# Patient Record
Sex: FEMALE | NOT HISPANIC OR LATINO | URBAN - METROPOLITAN AREA
[De-identification: names, ages, dates, MRNs, and addresses within clinical notes are randomized per-mention and may not be internally consistent; named-entity substitution may affect disease eponyms.]

---

## 2018-12-11 ENCOUNTER — OFFICE VISIT (OUTPATIENT)
Dept: URBAN - METROPOLITAN AREA CLINIC 76 | Facility: CLINIC | Age: 73
End: 2018-12-11
Payer: COMMERCIAL

## 2018-12-11 DIAGNOSIS — Z96.1 PRESENCE OF INTRAOCULAR LENS: ICD-10-CM

## 2018-12-11 DIAGNOSIS — H43.813 VITREOUS DEGENERATION, BILATERAL: ICD-10-CM

## 2018-12-11 DIAGNOSIS — H52.4 PRESBYOPIA: Primary | ICD-10-CM

## 2018-12-11 DIAGNOSIS — H10.45 OTHER CHRONIC ALLERGIC CONJUNCTIVITIS: ICD-10-CM

## 2018-12-11 PROCEDURE — 92004 COMPRE OPH EXAM NEW PT 1/>: CPT | Performed by: OPTOMETRIST

## 2018-12-11 RX ORDER — KETOTIFEN FUMARATE 0.35 MG/ML
SOLUTION/ DROPS OPHTHALMIC
Qty: 1 | Refills: 5 | Status: ACTIVE
Start: 2018-12-11

## 2018-12-11 ASSESSMENT — INTRAOCULAR PRESSURE
OS: 14
OD: 15

## 2018-12-11 ASSESSMENT — KERATOMETRY
OD: 45.63
OS: 44.00

## 2018-12-11 ASSESSMENT — VISUAL ACUITY
OD: 20/30
OS: 20/25

## 2018-12-11 NOTE — IMPRESSION/PLAN
Impression: Vitreous degeneration, bilateral: H43.813 OU. Plan: Posterior vitreous detachment accounts for the patient's complaints. There is no evidence of retinal pathology. All signs and risks of retinal detachment and tears were discussed in detail. Patient instructed to call the office immediately if any symptoms noted. Recommend the patient return to office for 1 month follow up.

## 2018-12-11 NOTE — IMPRESSION/PLAN
Impression: Presbyopia: H52.4. OU. Plan: Discussed condition. New mrx given today. Pt to call with any concerns.

## 2018-12-11 NOTE — IMPRESSION/PLAN
Impression: Other chronic allergic conjunctivitis: H10.45. OU. Plan: Discussed diagnosis with patient. Recommend OTC oral antihistamine, and Ketotifen 1 drop bid ou, prn. Rub excess into lids.

## 2024-07-19 ENCOUNTER — HOSPITAL ENCOUNTER (INPATIENT)
Facility: HOSPITAL | Age: 79
LOS: 5 days | Discharge: HOME HEALTH | DRG: 673 | End: 2024-07-24
Attending: EMERGENCY MEDICINE | Admitting: EMERGENCY MEDICINE
Payer: MEDICARE

## 2024-07-19 DIAGNOSIS — N17.9 ACUTE RENAL FAILURE: ICD-10-CM

## 2024-07-19 DIAGNOSIS — R53.83 FATIGUE, UNSPECIFIED TYPE: ICD-10-CM

## 2024-07-19 DIAGNOSIS — M79.89 SWELLING OF ARM: ICD-10-CM

## 2024-07-19 DIAGNOSIS — N17.9 ACUTE RENAL FAILURE, UNSPECIFIED ACUTE RENAL FAILURE TYPE: Primary | ICD-10-CM

## 2024-07-19 DIAGNOSIS — I10 HTN (HYPERTENSION), BENIGN: ICD-10-CM

## 2024-07-19 DIAGNOSIS — R10.13 EPIGASTRIC PAIN: ICD-10-CM

## 2024-07-19 DIAGNOSIS — R11.0 NAUSEA: ICD-10-CM

## 2024-07-19 LAB
ABS NEUT (OLG): 10.51 X10(3)/MCL (ref 2.1–9.2)
ALBUMIN SERPL-MCNC: 4.2 G/DL (ref 3.4–4.8)
ALBUMIN/GLOB SERPL: 1.4 RATIO (ref 1.1–2)
ALP SERPL-CCNC: 73 UNIT/L (ref 40–150)
ALT SERPL-CCNC: 9 UNIT/L (ref 0–55)
ANION GAP SERPL CALC-SCNC: 16 MEQ/L
APTT PPP: 29 SECONDS (ref 23.2–33.7)
AST SERPL-CCNC: 13 UNIT/L (ref 5–34)
BACTERIA #/AREA URNS AUTO: ABNORMAL /HPF
BASOPHILS NFR BLD MANUAL: 0.17 X10(3)/MCL (ref 0–0.2)
BASOPHILS NFR BLD MANUAL: 1 %
BILIRUB SERPL-MCNC: 0.3 MG/DL
BILIRUB UR QL STRIP.AUTO: NEGATIVE
BUN SERPL-MCNC: 171.8 MG/DL (ref 9.8–20.1)
CALCIUM SERPL-MCNC: 10.6 MG/DL (ref 8.4–10.2)
CHLORIDE SERPL-SCNC: 104 MMOL/L (ref 98–107)
CLARITY UR: CLEAR
CO2 SERPL-SCNC: 15 MMOL/L (ref 23–31)
COLOR UR AUTO: ABNORMAL
CREAT SERPL-MCNC: 8.22 MG/DL (ref 0.55–1.02)
CREAT/UREA NIT SERPL: 21
EOSINOPHIL NFR BLD MANUAL: 0.69 X10(3)/MCL (ref 0–0.9)
EOSINOPHIL NFR BLD MANUAL: 4 %
ERYTHROCYTE [DISTWIDTH] IN BLOOD BY AUTOMATED COUNT: 14 % (ref 11.5–17)
GFR SERPLBLD CREATININE-BSD FMLA CKD-EPI: 5 ML/MIN/1.73/M2
GLOBULIN SER-MCNC: 3.1 GM/DL (ref 2.4–3.5)
GLUCOSE SERPL-MCNC: 125 MG/DL (ref 82–115)
GLUCOSE UR QL STRIP: NORMAL
HBV SURFACE AG SERPL QL IA: NONREACTIVE
HCT VFR BLD AUTO: 30.5 % (ref 37–47)
HGB BLD-MCNC: 10 G/DL (ref 12–16)
HGB UR QL STRIP: NEGATIVE
HYALINE CASTS #/AREA URNS LPF: ABNORMAL /LPF
INR PPP: 1.1
INSTRUMENT WBC (OLG): 17.23 X10(3)/MCL
KETONES UR QL STRIP: NEGATIVE
LEUKOCYTE ESTERASE UR QL STRIP: 250
LIPASE SERPL-CCNC: 61 U/L
LYMPHOCYTES NFR BLD MANUAL: 32 %
LYMPHOCYTES NFR BLD MANUAL: 5.51 X10(3)/MCL
MACROCYTES BLD QL SMEAR: ABNORMAL
MAGNESIUM SERPL-MCNC: 3.5 MG/DL (ref 1.6–2.6)
MCH RBC QN AUTO: 31.7 PG (ref 27–31)
MCHC RBC AUTO-ENTMCNC: 32.8 G/DL (ref 33–36)
MCV RBC AUTO: 96.8 FL (ref 80–94)
MONOCYTES NFR BLD MANUAL: 0.34 X10(3)/MCL (ref 0.1–1.3)
MONOCYTES NFR BLD MANUAL: 2 %
MUCOUS THREADS URNS QL MICRO: ABNORMAL /LPF
NEUTROPHILS NFR BLD MANUAL: 61 %
NITRITE UR QL STRIP: NEGATIVE
NRBC BLD AUTO-RTO: 0 %
OHS QRS DURATION: 92 MS
OHS QTC CALCULATION: 456 MS
OVALOCYTES (OLG): ABNORMAL
PH UR STRIP: 5 [PH]
PHOSPHATE SERPL-MCNC: 7.9 MG/DL (ref 2.3–4.7)
PLATELET # BLD AUTO: 259 X10(3)/MCL (ref 130–400)
PLATELET # BLD EST: NORMAL 10*3/UL
PMV BLD AUTO: 11.5 FL (ref 7.4–10.4)
POIKILOCYTOSIS BLD QL SMEAR: ABNORMAL
POTASSIUM SERPL-SCNC: 4.9 MMOL/L (ref 3.5–5.1)
PROT SERPL-MCNC: 7.3 GM/DL (ref 5.8–7.6)
PROT UR QL STRIP: NEGATIVE
PROTHROMBIN TIME: 13.8 SECONDS (ref 12.5–14.5)
RBC # BLD AUTO: 3.15 X10(6)/MCL (ref 4.2–5.4)
RBC #/AREA URNS AUTO: ABNORMAL /HPF
RBC MORPH BLD: ABNORMAL
SODIUM SERPL-SCNC: 135 MMOL/L (ref 136–145)
SP GR UR STRIP.AUTO: 1.01 (ref 1–1.03)
SQUAMOUS #/AREA URNS LPF: ABNORMAL /HPF
TEAR DROP CELL (OLG): ABNORMAL
UROBILINOGEN UR STRIP-ACNC: NORMAL
WBC # BLD AUTO: 17.23 X10(3)/MCL (ref 4.5–11.5)
WBC #/AREA URNS AUTO: ABNORMAL /HPF

## 2024-07-19 PROCEDURE — 85025 COMPLETE CBC W/AUTO DIFF WBC: CPT | Performed by: PHYSICIAN ASSISTANT

## 2024-07-19 PROCEDURE — 11000001 HC ACUTE MED/SURG PRIVATE ROOM

## 2024-07-19 PROCEDURE — 99291 CRITICAL CARE FIRST HOUR: CPT

## 2024-07-19 PROCEDURE — 63600175 PHARM REV CODE 636 W HCPCS: Performed by: PHYSICIAN ASSISTANT

## 2024-07-19 PROCEDURE — 85027 COMPLETE CBC AUTOMATED: CPT | Performed by: PHYSICIAN ASSISTANT

## 2024-07-19 PROCEDURE — 99900035 HC TECH TIME PER 15 MIN (STAT)

## 2024-07-19 PROCEDURE — 25000003 PHARM REV CODE 250: Performed by: PHYSICIAN ASSISTANT

## 2024-07-19 PROCEDURE — 86706 HEP B SURFACE ANTIBODY: CPT | Performed by: INTERNAL MEDICINE

## 2024-07-19 PROCEDURE — 21400001 HC TELEMETRY ROOM

## 2024-07-19 PROCEDURE — 93010 ELECTROCARDIOGRAM REPORT: CPT | Mod: ,,, | Performed by: INTERNAL MEDICINE

## 2024-07-19 PROCEDURE — 85007 BL SMEAR W/DIFF WBC COUNT: CPT | Performed by: PHYSICIAN ASSISTANT

## 2024-07-19 PROCEDURE — 93005 ELECTROCARDIOGRAM TRACING: CPT

## 2024-07-19 PROCEDURE — 83735 ASSAY OF MAGNESIUM: CPT | Performed by: EMERGENCY MEDICINE

## 2024-07-19 PROCEDURE — 96374 THER/PROPH/DIAG INJ IV PUSH: CPT

## 2024-07-19 PROCEDURE — 5A09357 ASSISTANCE WITH RESPIRATORY VENTILATION, LESS THAN 24 CONSECUTIVE HOURS, CONTINUOUS POSITIVE AIRWAY PRESSURE: ICD-10-PCS | Performed by: INTERNAL MEDICINE

## 2024-07-19 PROCEDURE — 85610 PROTHROMBIN TIME: CPT | Performed by: EMERGENCY MEDICINE

## 2024-07-19 PROCEDURE — 94760 N-INVAS EAR/PLS OXIMETRY 1: CPT

## 2024-07-19 PROCEDURE — 84100 ASSAY OF PHOSPHORUS: CPT | Performed by: EMERGENCY MEDICINE

## 2024-07-19 PROCEDURE — 80053 COMPREHEN METABOLIC PANEL: CPT | Performed by: PHYSICIAN ASSISTANT

## 2024-07-19 PROCEDURE — 90935 HEMODIALYSIS ONE EVALUATION: CPT

## 2024-07-19 PROCEDURE — 02PY33Z REMOVAL OF INFUSION DEVICE FROM GREAT VESSEL, PERCUTANEOUS APPROACH: ICD-10-PCS | Performed by: STUDENT IN AN ORGANIZED HEALTH CARE EDUCATION/TRAINING PROGRAM

## 2024-07-19 PROCEDURE — 63600175 PHARM REV CODE 636 W HCPCS: Performed by: EMERGENCY MEDICINE

## 2024-07-19 PROCEDURE — 27000190 HC CPAP FULL FACE MASK W/VALVE

## 2024-07-19 PROCEDURE — 02HV33Z INSERTION OF INFUSION DEVICE INTO SUPERIOR VENA CAVA, PERCUTANEOUS APPROACH: ICD-10-PCS | Performed by: SURGERY

## 2024-07-19 PROCEDURE — 02HV33Z INSERTION OF INFUSION DEVICE INTO SUPERIOR VENA CAVA, PERCUTANEOUS APPROACH: ICD-10-PCS | Performed by: STUDENT IN AN ORGANIZED HEALTH CARE EDUCATION/TRAINING PROGRAM

## 2024-07-19 PROCEDURE — 85730 THROMBOPLASTIN TIME PARTIAL: CPT | Performed by: EMERGENCY MEDICINE

## 2024-07-19 PROCEDURE — 96361 HYDRATE IV INFUSION ADD-ON: CPT

## 2024-07-19 PROCEDURE — 5A1D70Z PERFORMANCE OF URINARY FILTRATION, INTERMITTENT, LESS THAN 6 HOURS PER DAY: ICD-10-PCS | Performed by: EMERGENCY MEDICINE

## 2024-07-19 PROCEDURE — 27100171 HC OXYGEN HIGH FLOW UP TO 24 HOURS

## 2024-07-19 PROCEDURE — 87040 BLOOD CULTURE FOR BACTERIA: CPT | Performed by: PHYSICIAN ASSISTANT

## 2024-07-19 PROCEDURE — 81001 URINALYSIS AUTO W/SCOPE: CPT | Performed by: PHYSICIAN ASSISTANT

## 2024-07-19 PROCEDURE — 0JH63XZ INSERTION OF TUNNELED VASCULAR ACCESS DEVICE INTO CHEST SUBCUTANEOUS TISSUE AND FASCIA, PERCUTANEOUS APPROACH: ICD-10-PCS | Performed by: STUDENT IN AN ORGANIZED HEALTH CARE EDUCATION/TRAINING PROGRAM

## 2024-07-19 PROCEDURE — 83690 ASSAY OF LIPASE: CPT | Performed by: PHYSICIAN ASSISTANT

## 2024-07-19 PROCEDURE — 87340 HEPATITIS B SURFACE AG IA: CPT | Performed by: INTERNAL MEDICINE

## 2024-07-19 PROCEDURE — 96376 TX/PRO/DX INJ SAME DRUG ADON: CPT

## 2024-07-19 PROCEDURE — 94660 CPAP INITIATION&MGMT: CPT

## 2024-07-19 RX ORDER — SPIRONOLACTONE 25 MG/1
25 TABLET ORAL DAILY
COMMUNITY

## 2024-07-19 RX ORDER — TIOTROPIUM BROMIDE INHALATION SPRAY 3.12 UG/1
2 SPRAY, METERED RESPIRATORY (INHALATION) DAILY
COMMUNITY

## 2024-07-19 RX ORDER — IPRATROPIUM BROMIDE AND ALBUTEROL 20; 100 UG/1; UG/1
1 SPRAY, METERED RESPIRATORY (INHALATION) EVERY 6 HOURS PRN
COMMUNITY

## 2024-07-19 RX ORDER — ACETAMINOPHEN 325 MG/1
650 TABLET ORAL EVERY 8 HOURS PRN
Status: DISCONTINUED | OUTPATIENT
Start: 2024-07-19 | End: 2024-07-24 | Stop reason: HOSPADM

## 2024-07-19 RX ORDER — GLUCAGON 1 MG
1 KIT INJECTION
Status: DISCONTINUED | OUTPATIENT
Start: 2024-07-19 | End: 2024-07-19

## 2024-07-19 RX ORDER — CHOLECALCIFEROL (VITAMIN D3) 25 MCG
500 TABLET ORAL DAILY
COMMUNITY

## 2024-07-19 RX ORDER — PROMETHAZINE HYDROCHLORIDE 25 MG/ML
25 INJECTION, SOLUTION INTRAMUSCULAR; INTRAVENOUS
Status: COMPLETED | OUTPATIENT
Start: 2024-07-19 | End: 2024-07-19

## 2024-07-19 RX ORDER — FOLIC ACID 1 MG/1
1 TABLET ORAL DAILY
COMMUNITY

## 2024-07-19 RX ORDER — LISINOPRIL 20 MG/1
20 TABLET ORAL DAILY
COMMUNITY

## 2024-07-19 RX ORDER — ALLOPURINOL 300 MG/1
300 TABLET ORAL DAILY
Status: ON HOLD | COMMUNITY
End: 2024-07-24 | Stop reason: HOSPADM

## 2024-07-19 RX ORDER — ACETAMINOPHEN 500 MG
500 TABLET ORAL EVERY 6 HOURS PRN
COMMUNITY

## 2024-07-19 RX ORDER — UBIDECARENONE 75 MG
500 CAPSULE ORAL DAILY
COMMUNITY

## 2024-07-19 RX ORDER — ALBUTEROL SULFATE 5 MG/ML
2.5 SOLUTION RESPIRATORY (INHALATION) 2 TIMES DAILY PRN
COMMUNITY

## 2024-07-19 RX ORDER — ACETAMINOPHEN 500 MG
2000 TABLET ORAL DAILY
COMMUNITY

## 2024-07-19 RX ORDER — ONDANSETRON HYDROCHLORIDE 2 MG/ML
4 INJECTION, SOLUTION INTRAVENOUS
Status: COMPLETED | OUTPATIENT
Start: 2024-07-19 | End: 2024-07-19

## 2024-07-19 RX ORDER — ESTRADIOL 0.1 MG/G
1 CREAM VAGINAL DAILY
COMMUNITY

## 2024-07-19 RX ORDER — BUPROPION HYDROCHLORIDE 150 MG/1
150 TABLET, EXTENDED RELEASE ORAL DAILY
COMMUNITY

## 2024-07-19 RX ORDER — LIDOCAINE 50 MG/G
1 PATCH TOPICAL
COMMUNITY

## 2024-07-19 RX ORDER — FUROSEMIDE 40 MG/1
40 TABLET ORAL DAILY
COMMUNITY

## 2024-07-19 RX ORDER — IBUPROFEN 200 MG
16 TABLET ORAL
Status: DISCONTINUED | OUTPATIENT
Start: 2024-07-19 | End: 2024-07-19

## 2024-07-19 RX ORDER — SODIUM CHLORIDE 9 MG/ML
1000 INJECTION, SOLUTION INTRAVENOUS
Status: COMPLETED | OUTPATIENT
Start: 2024-07-19 | End: 2024-07-19

## 2024-07-19 RX ORDER — CALCIUM CARBONATE 600 MG
600 TABLET ORAL DAILY
COMMUNITY

## 2024-07-19 RX ORDER — ASCORBIC ACID 500 MG
500 TABLET ORAL DAILY
COMMUNITY

## 2024-07-19 RX ORDER — FLUTICASONE PROPIONATE AND SALMETEROL 500; 50 UG/1; UG/1
1 POWDER RESPIRATORY (INHALATION) EVERY 12 HOURS
COMMUNITY

## 2024-07-19 RX ORDER — MONTELUKAST SODIUM 10 MG/1
10 TABLET ORAL DAILY
COMMUNITY

## 2024-07-19 RX ORDER — ASPIRIN 325 MG
325 TABLET, DELAYED RELEASE (ENTERIC COATED) ORAL DAILY
COMMUNITY

## 2024-07-19 RX ORDER — ACETAMINOPHEN 10 MG/ML
1000 INJECTION, SOLUTION INTRAVENOUS ONCE
Status: COMPLETED | OUTPATIENT
Start: 2024-07-19 | End: 2024-07-19

## 2024-07-19 RX ORDER — CITALOPRAM 20 MG/1
20 TABLET, FILM COATED ORAL DAILY
COMMUNITY

## 2024-07-19 RX ORDER — ACETAMINOPHEN 325 MG/1
650 TABLET ORAL EVERY 4 HOURS PRN
Status: DISCONTINUED | OUTPATIENT
Start: 2024-07-19 | End: 2024-07-24 | Stop reason: HOSPADM

## 2024-07-19 RX ORDER — SODIUM CHLORIDE 0.9 % (FLUSH) 0.9 %
10 SYRINGE (ML) INJECTION EVERY 12 HOURS PRN
Status: DISCONTINUED | OUTPATIENT
Start: 2024-07-19 | End: 2024-07-24 | Stop reason: HOSPADM

## 2024-07-19 RX ORDER — LANOLIN ALCOHOL/MO/W.PET/CERES
250 CREAM (GRAM) TOPICAL 2 TIMES DAILY
COMMUNITY

## 2024-07-19 RX ORDER — MUPIROCIN 20 MG/G
OINTMENT TOPICAL 2 TIMES DAILY
Status: DISCONTINUED | OUTPATIENT
Start: 2024-07-19 | End: 2024-07-24 | Stop reason: HOSPADM

## 2024-07-19 RX ORDER — ONDANSETRON HYDROCHLORIDE 2 MG/ML
4 INJECTION, SOLUTION INTRAVENOUS EVERY 4 HOURS PRN
Status: DISCONTINUED | OUTPATIENT
Start: 2024-07-19 | End: 2024-07-24 | Stop reason: HOSPADM

## 2024-07-19 RX ORDER — IBUPROFEN 200 MG
24 TABLET ORAL
Status: DISCONTINUED | OUTPATIENT
Start: 2024-07-19 | End: 2024-07-19

## 2024-07-19 RX ADMIN — PROMETHAZINE HYDROCHLORIDE 25 MG: 25 INJECTION INTRAMUSCULAR; INTRAVENOUS at 03:07

## 2024-07-19 RX ADMIN — ACETAMINOPHEN 650 MG: 325 TABLET, FILM COATED ORAL at 07:07

## 2024-07-19 RX ADMIN — ONDANSETRON 4 MG: 2 INJECTION INTRAMUSCULAR; INTRAVENOUS at 01:07

## 2024-07-19 RX ADMIN — ONDANSETRON 4 MG: 2 INJECTION INTRAMUSCULAR; INTRAVENOUS at 12:07

## 2024-07-19 RX ADMIN — ACETAMINOPHEN 1000 MG: 10 INJECTION, SOLUTION INTRAVENOUS at 12:07

## 2024-07-19 RX ADMIN — ONDANSETRON 4 MG: 2 INJECTION INTRAMUSCULAR; INTRAVENOUS at 07:07

## 2024-07-19 RX ADMIN — SODIUM CHLORIDE 1000 ML: 9 INJECTION, SOLUTION INTRAVENOUS at 12:07

## 2024-07-19 RX ADMIN — SODIUM CHLORIDE, POTASSIUM CHLORIDE, SODIUM LACTATE AND CALCIUM CHLORIDE 1000 ML: 600; 310; 30; 20 INJECTION, SOLUTION INTRAVENOUS at 03:07

## 2024-07-19 NOTE — Clinical Note
Catheter tip was inserted into the sheath and advanced down into place. Catheter tip placement was verified under fluoro and image saved.

## 2024-07-19 NOTE — ED PROVIDER NOTES
Encounter Date: 7/19/2024    SCRIBE #1 NOTE: I, Mario Rod, am scribing for, and in the presence of,  Elizabeth Mai, DO. I have scribed the following portions of the note - Other sections scribed: HPI, ROS, PE.       History     Chief Complaint   Patient presents with    Abdominal Pain     Pt to ed via pov with c/o upper b/l abd pain, vomiting, decreased urine output, and Lbm x1 week. Sent from Urgent Care. Afebrile. Hx: LLL, kidney fx.      Patient is a 77 y/o female presenting to the ED for evaluation of nausea, vomiting, and fatigue beginning 3 weeks ago. Pt also reports that she is unable to urinate or have a bowel movement. She is not on dialysis. Relative at the bedside reports that [facility] but was unable to go because she was sick at that time. Pt has a history of leukemia and is supposed to be receiving immunoglobulin infusions every 2 months, though she is past due for another infusion.    The history is provided by the patient and a relative. No  was used.     Review of patient's allergies indicates:   Allergen Reactions    Codeine Hives    Opioids - morphine analogues Hives     Past Medical History:   Diagnosis Date    CKD (chronic kidney disease)     Leukemia      No past surgical history on file.  No family history on file.     Review of Systems   Constitutional:  Positive for fatigue.   Gastrointestinal:  Positive for constipation, nausea and vomiting.   Genitourinary:  Positive for decreased urine volume.       Physical Exam     Initial Vitals [07/19/24 1110]   BP Pulse Resp Temp SpO2   116/74 (!) 114 20 97.3 °F (36.3 °C) 97 %      MAP       --         Physical Exam    Nursing note and vitals reviewed.  Constitutional: She appears well-developed and well-nourished. She is not diaphoretic. She does not appear ill. No distress.   HENT:   Head: Normocephalic and atraumatic.   Right Ear: External ear normal.   Left Ear: External  ear normal.   Mouth/Throat: Oropharynx is clear and moist.   Eyes: Conjunctivae are normal.   Neck: Neck supple. No tracheal deviation present.   Cardiovascular:  Regular rhythm and normal heart sounds.   Tachycardia present.         No murmur heard.  Pulmonary/Chest: Breath sounds normal. No respiratory distress. She has no wheezes. She has no rhonchi. She has no rales.   Abdominal: Abdomen is soft. She exhibits no distension. There is no abdominal tenderness.   No right CVA tenderness.  No left CVA tenderness.   Musculoskeletal:         General: Normal range of motion.      Cervical back: Neck supple.     Neurological: She is alert and oriented to person, place, and time. GCS score is 15. GCS eye subscore is 4. GCS verbal subscore is 5. GCS motor subscore is 6.   Skin: Skin is warm and dry. Capillary refill takes less than 2 seconds. No rash noted. No pallor.         ED Course   Critical Care    Date/Time: 7/19/2024 4:43 PM    Performed by: Elizabeth Mai MD  Authorized by: Elizabeth Mai MD  Direct patient critical care time: 10 minutes  Additional history critical care time: 5 minutes  Ordering / reviewing critical care time: 5 minutes  Documentation critical care time: 5 minutes  Consulting other physicians critical care time: 8 minutes  Total critical care time (exclusive of procedural time) : 33 minutes  Critical care time was exclusive of separately billable procedures and treating other patients and teaching time.  Critical care was necessary to treat or prevent imminent or life-threatening deterioration of the following conditions: cardiac failure, dehydration, metabolic crisis, renal failure and CNS failure or compromise.  Critical care was time spent personally by me on the following activities: blood draw for specimens, development of treatment plan with patient or surrogate, discussions with consultants, interpretation of cardiac output measurements, evaluation of patient's response to treatment,  examination of patient, obtaining history from patient or surrogate, ordering and performing treatments and interventions and ordering and review of laboratory studies.        Labs Reviewed   URINALYSIS, REFLEX TO URINE CULTURE - Abnormal       Result Value    Color, UA Light-Yellow      Appearance, UA Clear      Specific Gravity, UA 1.012      pH, UA 5.0      Protein, UA Negative      Glucose, UA Normal      Ketones, UA Negative      Blood, UA Negative      Bilirubin, UA Negative      Urobilinogen, UA Normal      Nitrites, UA Negative      Leukocyte Esterase,  (*)     RBC, UA 0-5      WBC, UA 3-5      Bacteria, UA Trace      Squamous Epithelial Cells, UA Trace      Mucous, UA Trace (*)     Hyaline Casts, UA 3-5 (*)    LIPASE - Abnormal    Lipase Level 61 (*)    COMPREHENSIVE METABOLIC PANEL - Abnormal    Sodium 135 (*)     Potassium 4.9      Chloride 104      CO2 15 (*)     Glucose 125 (*)     Blood Urea Nitrogen 171.8 (*)     Creatinine 8.22 (*)     Calcium 10.6 (*)     Protein Total 7.3      Albumin 4.2      Globulin 3.1      Albumin/Globulin Ratio 1.4      Bilirubin Total 0.3      ALP 73      ALT 9      AST 13      eGFR 5      Anion Gap 16.0      BUN/Creatinine Ratio 21     CBC WITH DIFFERENTIAL - Abnormal    WBC 17.23 (*)     RBC 3.15 (*)     Hgb 10.0 (*)     Hct 30.5 (*)     MCV 96.8 (*)     MCH 31.7 (*)     MCHC 32.8 (*)     RDW 14.0      Platelet 259      MPV 11.5 (*)     NRBC% 0.0     MANUAL DIFFERENTIAL - Abnormal    WBC 17.23      Neutrophils % 61      Lymphs % 32      Monocytes % 2      Eosinophils % 4      Basophils % 1      Neutrophils Abs 10.5103 (*)     Lymphs Abs 5.5136 (*)     Monocytes Abs 0.3446      Eosinophils Abs 0.6892      Basophils Abs 0.1723      Platelets Normal      RBC Morph Abnormal (*)     Poikilocytosis 1+ (*)     Macrocytosis 1+ (*)     Ovalocytes 1+ (*)     Tear Drops 1+ (*)    APTT - Normal    PTT 29.0     PROTIME-INR - Normal    PT 13.8      INR 1.1     HEPATITIS B SURFACE  ANTIGEN - Normal    Hep BsAg Interp Nonreactive     BLOOD CULTURE OLG   BLOOD CULTURE OLG   CBC W/ AUTO DIFFERENTIAL    Narrative:     The following orders were created for panel order CBC Auto Differential.  Procedure                               Abnormality         Status                     ---------                               -----------         ------                     CBC with Differential[[id number]]       Abnormal            Final result               Manual Differential[[id number]]         Abnormal            Final result                 Please view results for these tests on the individual orders.   MAGNESIUM   PHOSPHORUS   HEPATITIS B SURFACE ANTIBODY, QUAL/QUANT   URINALYSIS, REFLEX TO URINE CULTURE     EKG Readings: (Independently Interpreted)   Initial Reading: No STEMI. Rhythm: Junctional Rhythm. Heart Rate: 111.   Performed at 1111     ECG Results              EKG 12-lead (Final result)        Collection Time Result Time QRS Duration OHS QTC Calculation    07/19/24 11:11:38 07/19/24 13:58:49 92 456                     Final result by Interface, Lab [facility] (07/19/24 13:58:58)                   Narrative:    Test Reason : R10.13,    Vent. Rate : 111 BPM     Atrial Rate : 000 BPM     P-R Int : 000 ms          QRS Dur : 092 ms      QT Int : 336 ms       P-R-T Axes : 000 058 055 degrees     QTc Int : 456 ms    Accelerated Junctional rhythm  Abnormal ECG  No previous ECGs available  Confirmed by Carlitos Haddad MD ([id number]) on 7/19/2024 1:58:48 PM    Referred By:             Confirmed By:Carlitos Haddad MD                                  Imaging Results              CT Abdomen Pelvis  Without Contrast (Final result)  Result time 07/19/24 14:23:36   Procedure changed from CT Abdomen Pelvis With IV Contrast NO Oral Contrast     Final result by Ladi Flores MD (07/19/24 14:23:36)                   Impression:      1. No evidence of bowel obstruction  2. Small left lower lobe pulmonary nodule.  In  absence of outside prior for comparison, for a solid nodule 6-8 mm, Fleischner Society 2017 guidelines recommend follow up with non-contrast chest CT at 6-12 months and 18-24 months after discovery.      Electronically signed by: Ladi Flores  Date:    07/19/2024  Time:    14:23               Narrative:    EXAMINATION:  CT ABDOMEN PELVIS WITHOUT CONTRAST    CLINICAL HISTORY:  Bowel obstruction suspected;Nausea/vomiting;    TECHNIQUE:  Helically acquired axial images, sagittal and coronal reformations were obtained from the lung bases to the pubic symphysis without the IV administration of contrast.    Automated tube current modulation, weight-based exposure dosing, and/or iterative reconstruction technique utilized to reach lowest reasonably achievable exposure rate.    DLP: 1713 mGy*cm    COMPARISON:  No relevant prior available for comparison at the time of dictation.    FINDINGS:  HEART: Normal in size. No pericardial effusion.    LUNG BASES: 6.5 mm left lower lobe pulmonary nodule.  For a solid nodule 6-8 mm, Fleischner Society 2017 guidelines recommend follow up with non-contrast chest CT at 6-12 months and 18-24 months after discovery.    LIVER: Normal attenuation. No appreciable focal hepatic lesion.    BILIARY: Gallbladder is surgically absent.    PANCREAS: No inflammatory change.    SPLEEN: Normal in size    ADRENALS: No mass.    KIDNEYS/URETERS: No renal or ureteral calculus. No hydronephrosis.    GI TRACT/MESENTERY: Evaluation of the bowel is limited without contrast. No evidence of bowel obstruction.   Colonic diverticulosis without acute inflammatory change.    PERITONEUM: No free fluid.No free air.    LYMPH NODES: No enlarged lymph nodes by size criteria.    VASCULATURE: Aortoiliac atherosclerosis.    BLADDER: Normal appearance given degree of distention.    REPRODUCTIVE ORGANS: Uterus is surgically absent.  There is a 5 cm cyst midline above the vaginal cuff, likely left adnexal.    ABDOMINAL  WALL: Unremarkable.    BONES: Degenerative change at the thoracolumbar spine.  Multilevel disc space narrowing and facet arthropathy.                                       X-Ray Chest AP Portable (Final result)  Result time 07/19/24 11:55:41      Final result by Sanjay Brunner MD (07/19/24 11:55:41)                   Narrative:    EXAMINATION  XR CHEST AP PORTABLE    CLINICAL HISTORY  Epigastric pain    TECHNIQUE  A total of 1 frontal image(s) of the chest.    COMPARISON  None available at the time of initial interpretation.    FINDINGS  Lines/tubes/devices: none present    The cardiomediastinal silhouette and central pulmonary vasculature are unremarkable for utilized technique.  The trachea is midline. There is no lobar consolidation, pleural effusion, or pneumothorax.  Calcified right lung granuloma is incidentally noted.    There regional osseous degenerative changes with partially visualized left shoulder arthroplasty components.  No convincing acutely displaced fracture.    IMPRESSION  No convincing acute radiographic abnormality.      Electronically signed by: Sanjay Brunner  Date:    07/19/2024  Time:    11:55                                     Medications   ondansetron injection 4 mg (4 mg Intravenous Given 7/19/24 1213)   0.9%  NaCl infusion (0 mLs Intravenous Stopped 7/19/24 1443)   acetaminophen 1,000 mg/100 mL (10 mg/mL) injection 1,000 mg (0 mg Intravenous Stopped 7/19/24 1216)   ondansetron injection 4 mg (4 mg Intravenous Given 7/19/24 1351)   lactated ringers bolus 1,000 mL (1,000 mLs Intravenous New Bag 7/19/24 1529)   promethazine injection 25 mg (25 mg Intramuscular Given 7/19/24 1556)     Medical Decision Making  The differential diagnosis includes, but is not limited to: bowel obstruction, renal failure, dehydration, electrolyte abnormality    Problems Addressed:  Acute renal failure, unspecified acute renal failure type: acute illness or injury that poses a threat to life or bodily  functions  Fatigue, unspecified type: acute illness or injury that poses a threat to life or bodily functions  Nausea: acute illness or injury that poses a threat to life or bodily functions    Amount and/or Complexity of Data Reviewed  Independent Historian: caregiver     Details: Relative at the bedside reports that pt [facility] but was unable to go because she was sick at that time. Pt has a history of leukemia and is supposed to be receiving immunoglobulin infusions every 2 months, though she is past due for another infusion.  Labs: ordered. Decision-making details documented in ED Course.  Radiology: ordered. Decision-making details documented in ED Course.    Risk  Prescription drug management.  Decision regarding hospitalization.            Scribe Attestation:   Scribe #1: I performed the above scribed service and the documentation accurately describes the services I performed. I attest to the accuracy of the note.    Attending Attestation:           Physician Attestation for Scribe:  Physician Attestation Statement for Scribe #1: I, Elizabeth Mai, DO, reviewed documentation, as scribed by Mario Rod in my presence, and it is both accurate and complete.             ED Course as of 07/19/24 1645   Fri Jul 19, 2024   [id number] Renal consult: spoke with Dr Fitch who recs temp cath placement by surgery and initiate dialysis tonight  [KM]   1530 Admitted to Dr Waller [KM]   1546 Spoke with surgery regarding temp cath placement  [KM]      ED Course User Index  [KM] Elizabeth Mai MD                             Clinical Impression:  Final diagnoses:  [R10.13] Epigastric pain  [N17.9] Acute renal failure, unspecified acute renal failure type (Primary)  [R53.83] Fatigue, unspecified type  [R11.0] Nausea          ED Disposition Condition    Admit Stable                Elizabeth Mai MD  07/19/24 1643       Elizabeth Mai MD  07/19/24 1645

## 2024-07-19 NOTE — PROCEDURES
Right Internal Jugular Temporary Catheter Operative Note    Patient Name: Leonor Torrez  Age: 78 y.o.  Sex: female  YOB: 1945  MRN: [id number]  Author: Darius Lambert  Location: ED 28    Date: 7/19/2024    Pre Op Dx: ESRD; Acute on chronic renal failure    Post Op Dx: Same as above    Procedure performed:  Right internal jugular temporary dialysis catheter insertion    Surgeon: Darius Lambert MD PGY-1; Danielle Liu DO PGY-2    Anesthesia Type: 1% Local with lidocaine     Drains: None    Complications: None    Estimated Blood Loss: Minimal      Indications: Acute renal failure requiring emergent initiation of hemodialysis    Findings:  Both lumens aspirated and flushed easily.     Procedure in detail:  After obtaining verbal and written consent, the patient was appropriately positioned supine with slight Trendelenburg. The neck was prepped and draped in the usual sterile technique. Using ultrasound guidance the right internal jugular vein was visualized and viewed as patent; the vein was then accessed using real time ultrasound visualization of needle entry to the vein while applying negative pressure. Venous blood returned easily into the syringe. Seldinger technique was used to advance the guidewire into the vein without resistance. A skin nick was made with an 11 blade. The tract was then dilated without difficulty. The 11.5 Slovak, 13 cm Medcomp catheter was advanced over the wire easily. The guidewire was removed. The catheter was then secured with 2-0 silk sutures. All three access ports aspirated and flushed easily.  A sterile dressing was then applied. Patient tolerated this procedure well without any immediate complications.     Chest Xray was ordered evaluate placement.     Darius Lambert     Note reviewed and edited as appropriate. Agree with plan as written above.     Danielle Liu DO  [facility] General Surgery PGY-2

## 2024-07-19 NOTE — Clinical Note
A catheter was placed Lesion documentation: CATHETER was tunneled into the right chest wall up to the right IJ sheath site

## 2024-07-19 NOTE — FIRST PROVIDER EVALUATION
Medical screening examination initiated.  I have conducted a focused provider triage encounter, findings are as follows:    Brief history of present illness:  78yoF with nausea, decrease BM and decrease urine output x 1 week. History of kidney failure- no dialysis. Has belly pain- epigastric. Currently has CLL. No chemo. Last IgG treatment 2months ago. No fever.     There were no vitals filed for this visit.    Pertinent physical exam:  NAD    Brief workup plan:  labs, imaging, exam    Preliminary workup initiated; this workup will be continued and followed by the physician or advanced practice provider that is assigned to the patient when roomed.

## 2024-07-20 LAB
ALBUMIN SERPL-MCNC: 3.5 G/DL (ref 3.4–4.8)
ALBUMIN/GLOB SERPL: 1.8 RATIO (ref 1.1–2)
ALP SERPL-CCNC: 60 UNIT/L (ref 40–150)
ALT SERPL-CCNC: 8 UNIT/L (ref 0–55)
ANION GAP SERPL CALC-SCNC: 12 MEQ/L
APICAL FOUR CHAMBER EJECTION FRACTION: 55 %
AST SERPL-CCNC: 14 UNIT/L (ref 5–34)
AV INDEX (PROSTH): 0.88
AV MEAN GRADIENT: 5 MMHG
AV PEAK GRADIENT: 10 MMHG
AV VALVE AREA BY VELOCITY RATIO: 2.56 CM²
AV VALVE AREA: 2.78 CM²
AV VELOCITY RATIO: 0.82
BACTERIA #/AREA URNS AUTO: ABNORMAL /HPF
BASOPHILS # BLD AUTO: 0.05 X10(3)/MCL
BASOPHILS NFR BLD AUTO: 0.4 %
BILIRUB SERPL-MCNC: 0.4 MG/DL
BILIRUB UR QL STRIP.AUTO: NEGATIVE
BSA FOR ECHO PROCEDURE: 2.02 M2
BUN SERPL-MCNC: 86.6 MG/DL (ref 9.8–20.1)
CALCIUM SERPL-MCNC: 8.7 MG/DL (ref 8.4–10.2)
CHLORIDE SERPL-SCNC: 103 MMOL/L (ref 98–107)
CLARITY UR: CLEAR
CO2 SERPL-SCNC: 20 MMOL/L (ref 23–31)
COLOR UR AUTO: COLORLESS
CREAT SERPL-MCNC: 4.39 MG/DL (ref 0.55–1.02)
CREAT/UREA NIT SERPL: 20
CV ECHO LV RWT: 0.45 CM
DOP CALC AO PEAK VEL: 1.58 M/S
DOP CALC AO VTI: 26.8 CM
DOP CALC LVOT AREA: 3.1 CM2
DOP CALC LVOT DIAMETER: 2 CM
DOP CALC LVOT PEAK VEL: 1.29 M/S
DOP CALC LVOT STROKE VOLUME: 74.42 CM3
DOP CALC MV VTI: 28.7 CM
DOP CALCLVOT PEAK VEL VTI: 23.7 CM
E WAVE DECELERATION TIME: 248 MSEC
E/A RATIO: 0.69
E/E' RATIO: 6.91 M/S
ECHO LV POSTERIOR WALL: 0.98 CM (ref 0.6–1.1)
EOSINOPHIL # BLD AUTO: 0.49 X10(3)/MCL (ref 0–0.9)
EOSINOPHIL NFR BLD AUTO: 3.8 %
ERYTHROCYTE [DISTWIDTH] IN BLOOD BY AUTOMATED COUNT: 13.6 % (ref 11.5–17)
FERRITIN SERPL-MCNC: 334.05 NG/ML (ref 4.63–204)
FRACTIONAL SHORTENING: 32 % (ref 28–44)
GFR SERPLBLD CREATININE-BSD FMLA CKD-EPI: 10 ML/MIN/1.73/M2
GLOBULIN SER-MCNC: 2 GM/DL (ref 2.4–3.5)
GLUCOSE SERPL-MCNC: 94 MG/DL (ref 82–115)
GLUCOSE UR QL STRIP: NORMAL
HBV SURFACE AB SER QL IA: POSITIVE
HBV SURFACE AB SERPL IA-ACNC: 115 MIU/ML
HCT VFR BLD AUTO: 24.6 % (ref 37–47)
HGB BLD-MCNC: 8.2 G/DL (ref 12–16)
HGB UR QL STRIP: NEGATIVE
HYALINE CASTS #/AREA URNS LPF: ABNORMAL /LPF
IMM GRANULOCYTES # BLD AUTO: 0.04 X10(3)/MCL (ref 0–0.04)
IMM GRANULOCYTES NFR BLD AUTO: 0.3 %
INTERVENTRICULAR SEPTUM: 0.69 CM (ref 0.6–1.1)
IRON SATN MFR SERPL: 39 % (ref 20–50)
IRON SERPL-MCNC: 89 UG/DL (ref 50–170)
KETONES UR QL STRIP: NEGATIVE
LEFT ATRIUM AREA SYSTOLIC (APICAL 2 CHAMBER): 16.6 CM2
LEFT ATRIUM AREA SYSTOLIC (APICAL 4 CHAMBER): 12.4 CM2
LEFT ATRIUM SIZE: 3.4 CM
LEFT ATRIUM VOLUME INDEX MOD: 18 ML/M2
LEFT ATRIUM VOLUME MOD: 34.8 CM3
LEFT INTERNAL DIMENSION IN SYSTOLE: 2.95 CM (ref 2.1–4)
LEFT VENTRICLE DIASTOLIC VOLUME INDEX: 43.27 ML/M2
LEFT VENTRICLE DIASTOLIC VOLUME: 83.52 ML
LEFT VENTRICLE END DIASTOLIC VOLUME APICAL 4 CHAMBER: 82.3 ML
LEFT VENTRICLE END SYSTOLIC VOLUME APICAL 2 CHAMBER: 44 ML
LEFT VENTRICLE END SYSTOLIC VOLUME APICAL 4 CHAMBER: 26.4 ML
LEFT VENTRICLE MASS INDEX: 58 G/M2
LEFT VENTRICLE SYSTOLIC VOLUME INDEX: 17.4 ML/M2
LEFT VENTRICLE SYSTOLIC VOLUME: 33.59 ML
LEFT VENTRICULAR INTERNAL DIMENSION IN DIASTOLE: 4.31 CM (ref 3.5–6)
LEFT VENTRICULAR MASS: 111.91 G
LEUKOCYTE ESTERASE UR QL STRIP: NEGATIVE
LV LATERAL E/E' RATIO: 6.91 M/S
LV SEPTAL E/E' RATIO: 6.91 M/S
LVED V (TEICH): 83.52 ML
LVES V (TEICH): 33.59 ML
LVOT MG: 3 MMHG
LVOT MV: 0.82 CM/S
LYMPHOCYTES # BLD AUTO: 6.68 X10(3)/MCL (ref 0.6–4.6)
LYMPHOCYTES NFR BLD AUTO: 51.9 %
MCH RBC QN AUTO: 32.2 PG (ref 27–31)
MCHC RBC AUTO-ENTMCNC: 33.3 G/DL (ref 33–36)
MCV RBC AUTO: 96.5 FL (ref 80–94)
MONOCYTES # BLD AUTO: 0.56 X10(3)/MCL (ref 0.1–1.3)
MONOCYTES NFR BLD AUTO: 4.4 %
MUCOUS THREADS URNS QL MICRO: ABNORMAL /LPF
MV MEAN GRADIENT: 3 MMHG
MV PEAK A VEL: 1.1 M/S
MV PEAK E VEL: 0.76 M/S
MV PEAK GRADIENT: 6 MMHG
MV STENOSIS PRESSURE HALF TIME: 53 MS
MV VALVE AREA BY CONTINUITY EQUATION: 2.59 CM2
MV VALVE AREA P 1/2 METHOD: 4.15 CM2
NEUTROPHILS # BLD AUTO: 5.04 X10(3)/MCL (ref 2.1–9.2)
NEUTROPHILS NFR BLD AUTO: 39.2 %
NITRITE UR QL STRIP: NEGATIVE
NRBC BLD AUTO-RTO: 0 %
PH UR STRIP: 5.5 [PH]
PISA TR MAX VEL: 3.3 M/S
PLATELET # BLD AUTO: 184 X10(3)/MCL (ref 130–400)
PMV BLD AUTO: 11.6 FL (ref 7.4–10.4)
POTASSIUM SERPL-SCNC: 4 MMOL/L (ref 3.5–5.1)
PROT SERPL-MCNC: 5.5 GM/DL (ref 5.8–7.6)
PROT UR QL STRIP: NEGATIVE
PV PEAK GRADIENT: 5 MMHG
PV PEAK VELOCITY: 1.08 M/S
RA PRESSURE ESTIMATED: 3 MMHG
RBC # BLD AUTO: 2.55 X10(6)/MCL (ref 4.2–5.4)
RBC #/AREA URNS AUTO: ABNORMAL /HPF
RV TB RVSP: 6 MMHG
SODIUM SERPL-SCNC: 135 MMOL/L (ref 136–145)
SP GR UR STRIP.AUTO: 1.01 (ref 1–1.03)
SQUAMOUS #/AREA URNS LPF: ABNORMAL /HPF
TDI LATERAL: 0.11 M/S
TDI SEPTAL: 0.11 M/S
TDI: 0.11 M/S
TIBC SERPL-MCNC: 139 UG/DL (ref 70–310)
TIBC SERPL-MCNC: 228 UG/DL (ref 250–450)
TR MAX PG: 44 MMHG
TRANSFERRIN SERPL-MCNC: 208 MG/DL (ref 173–360)
TRICUSPID ANNULAR PLANE SYSTOLIC EXCURSION: 2.44 CM
TV REST PULMONARY ARTERY PRESSURE: 47 MMHG
UROBILINOGEN UR STRIP-ACNC: NORMAL
WBC # BLD AUTO: 12.86 X10(3)/MCL (ref 4.5–11.5)
WBC #/AREA URNS AUTO: ABNORMAL /HPF
Z-SCORE OF LEFT VENTRICULAR DIMENSION IN END DIASTOLE: -2.37
Z-SCORE OF LEFT VENTRICULAR DIMENSION IN END SYSTOLE: -1.04

## 2024-07-20 PROCEDURE — 80053 COMPREHEN METABOLIC PANEL: CPT | Performed by: PHYSICIAN ASSISTANT

## 2024-07-20 PROCEDURE — 36415 COLL VENOUS BLD VENIPUNCTURE: CPT | Performed by: PHYSICIAN ASSISTANT

## 2024-07-20 PROCEDURE — 94760 N-INVAS EAR/PLS OXIMETRY 1: CPT

## 2024-07-20 PROCEDURE — 81001 URINALYSIS AUTO W/SCOPE: CPT | Performed by: PHYSICIAN ASSISTANT

## 2024-07-20 PROCEDURE — 27000221 HC OXYGEN, UP TO 24 HOURS

## 2024-07-20 PROCEDURE — 25000003 PHARM REV CODE 250: Performed by: INTERNAL MEDICINE

## 2024-07-20 PROCEDURE — 63600175 PHARM REV CODE 636 W HCPCS: Mod: JZ | Performed by: INTERNAL MEDICINE

## 2024-07-20 PROCEDURE — 83550 IRON BINDING TEST: CPT | Performed by: INTERNAL MEDICINE

## 2024-07-20 PROCEDURE — 21400001 HC TELEMETRY ROOM

## 2024-07-20 PROCEDURE — 90935 HEMODIALYSIS ONE EVALUATION: CPT

## 2024-07-20 PROCEDURE — 25000003 PHARM REV CODE 250: Performed by: PHYSICIAN ASSISTANT

## 2024-07-20 PROCEDURE — 83540 ASSAY OF IRON: CPT | Performed by: INTERNAL MEDICINE

## 2024-07-20 PROCEDURE — 94640 AIRWAY INHALATION TREATMENT: CPT

## 2024-07-20 PROCEDURE — 99900031 HC PATIENT EDUCATION (STAT)

## 2024-07-20 PROCEDURE — 97162 PT EVAL MOD COMPLEX 30 MIN: CPT

## 2024-07-20 PROCEDURE — 99900035 HC TECH TIME PER 15 MIN (STAT)

## 2024-07-20 PROCEDURE — 25000242 PHARM REV CODE 250 ALT 637 W/ HCPCS: Performed by: INTERNAL MEDICINE

## 2024-07-20 PROCEDURE — 82728 ASSAY OF FERRITIN: CPT | Performed by: INTERNAL MEDICINE

## 2024-07-20 PROCEDURE — 85025 COMPLETE CBC W/AUTO DIFF WBC: CPT | Performed by: PHYSICIAN ASSISTANT

## 2024-07-20 RX ORDER — IPRATROPIUM BROMIDE AND ALBUTEROL SULFATE 2.5; .5 MG/3ML; MG/3ML
3 SOLUTION RESPIRATORY (INHALATION) EVERY 4 HOURS PRN
Status: DISCONTINUED | OUTPATIENT
Start: 2024-07-20 | End: 2024-07-24 | Stop reason: HOSPADM

## 2024-07-20 RX ORDER — IPRATROPIUM BROMIDE AND ALBUTEROL SULFATE 2.5; .5 MG/3ML; MG/3ML
3 SOLUTION RESPIRATORY (INHALATION) ONCE
Status: COMPLETED | OUTPATIENT
Start: 2024-07-20 | End: 2024-07-20

## 2024-07-20 RX ADMIN — ACETAMINOPHEN 650 MG: 325 TABLET, FILM COATED ORAL at 09:07

## 2024-07-20 RX ADMIN — ERYTHROPOIETIN 20000 UNITS: 4000 INJECTION, SOLUTION INTRAVENOUS; SUBCUTANEOUS at 12:07

## 2024-07-20 RX ADMIN — MUPIROCIN: 20 OINTMENT TOPICAL at 09:07

## 2024-07-20 RX ADMIN — Medication 1 CAPSULE: at 11:07

## 2024-07-20 RX ADMIN — IPRATROPIUM BROMIDE AND ALBUTEROL SULFATE 3 ML: .5; 3 SOLUTION RESPIRATORY (INHALATION) at 04:07

## 2024-07-20 NOTE — PLAN OF CARE
Problem: Physical Therapy  Goal: Physical Therapy Goal  Description: Goals to be met by: 24     Patient will increase functional independence with mobility by performin. Supine to sit with Modified Lares  2. Sit to stand transfer with Modified Lares  3. Bed to chair transfer with Modified Lares using Rolling Walker  4. Gait  x 20 feet with Stand-by Assistance using Rolling Walker.     Outcome: Progressing

## 2024-07-20 NOTE — CONSULTS
Nephrology  Inpatient consult to Nephrology  Consult performed by: Tom Fitch MD  Consult ordered by: Elizabeth Mai MD        Chief Complaint   Patient presents with    Abdominal Pain     Pt to ed via pov with c/o upper b/l abd pain, vomiting, decreased urine output, and Lbm x1 week. Sent from Urgent Care. Afebrile. Hx: LLL, kidney fx.      HPI: 77 yo female consulted for LATHA/CKD 5.  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  She recently moved to [place] from [place], [facility] with [facility] to continued treatments. She presented to ED yesterday c/o decreased urinary output x 4 days, poor appetite with N/V x 3 weeks, sent by urgent care in [place].  She denied any CP/SOB, fever, diarrhea, swelling or flank pain.  She had been counseled for need for eventual dialysis in the past but never had an AV fistula placed.  Patient reported to be wheelchair-bound at home due to OA L hip and knee, apparently not a candidate for needed hip surgery.  She reportedly has been getting up without the wheelchair and having several falls at home recently.  In ED, she was AF, P 114, /74, with labs showing WBC 17, H&H 10/30.5, CO2 15, BUN/creatinine 172 / 8.2, glucose 125, lipase 61. EKG with accelerated junction rhythm, CXR unremarkable. CTA only showed small left lower lobe pulmonary nodule.  In ED she received IV fluid hydration, Zofran and Phenergan, had temp HD cath placed then HD with no fluid removal.  Today, she notes feeling MUCH better, ate well for bkfst, no SOB/CP/N/V     Review of Systems   Constitutional: Negative.    HENT: Negative.     Respiratory: Negative.     Cardiovascular: Negative.    Gastrointestinal: Negative.    Genitourinary: Negative.    Musculoskeletal:         Arthritis pain at  baseline   Neurological: Negative.    Psychiatric/Behavioral: Negative.        All other systems negative except for HPI      Past Medical History:   Diagnosis Date    CKD (chronic kidney disease)     Leukemia    CKD 5  CAD  CVA  HTN  HLD  AF  CLL  OA, DJD  Spinal stenosis  Gout?  Anx/Dep    Past Sx Hx:  CAB  Coronary stent  L shoulder replacement   R TKR  ENZO  Anisa  Tonsillectomy  Abd washout     No family history on file.     Social History     Socioeconomic History    Marital status:    Pt denied smoking hx or EtOH use  Uses CBD gummies     Review of patient's allergies indicates:   Allergen Reactions    Codeine Hives    Opioids - morphine analogues Hives        Current Outpatient Medications   Medication Instructions    acetaminophen (TYLENOL) 500 mg, Oral, Every 6 hours PRN    albuterol (PROVENTIL) 2.5 mg, Nebulization, 2 times daily PRN, Rescue    allopurinoL (ZYLOPRIM) 300 mg, Oral, Daily    ascorbic acid (vitamin C) (VITAMIN C) 500 mg, Oral, Daily    aspirin (ECOTRIN) 325 mg, Oral, Daily    buPROPion (WELLBUTRIN SR) 150 mg, Oral, Daily    calcium carbonate (OS-LETITIA) 600 mg, Oral, Daily    CHLORPHENIRAMINE MALEATE ORAL 4 mg, Oral, Once as needed    cholecalciferol (vitamin D3) (VITAMIN D3) 2,000 Units, Oral, Daily, 50 mcg    citalopram (CELEXA) 20 mg, Oral, Daily    cyanocobalamin 500 mcg, Oral, Daily    estradioL (ESTRACE) 1 g, Vaginal, Daily    fluticasone-salmeterol diskus inhaler 500-50 mcg 1 puff, Inhalation, Every 12 hours, Controller    folic acid (FOLVITE) 1 mg, Oral, Daily    furosemide (LASIX) 40 mg, Oral, Daily    ipratropium-albuteroL (COMBIVENT RESPIMAT)  mcg/actuation inhaler 1 puff, Inhalation, Every 6 hours PRN, Rescue    LIDOcaine (LIDODERM) 5 % 1 patch, Transdermal, Every 24 hours (non-standard times), Remove & Discard patch within 12 hours or as directed by MD    lisinopriL (PRINIVIL,ZESTRIL) 20 mg, Oral, Daily    magnesium oxide (MAG-OX) 250 mg, Oral, 2 times daily     montelukast (SINGULAIR) 10 mg, Oral, Daily    spironolactone (ALDACTONE) 25 mg, Oral, Daily    tiotropium bromide (SPIRIVA RESPIMAT) 2.5 mcg/actuation inhaler 2 puffs, Inhalation, Daily, Controller    vitamin D (VITAMIN D3) 500 Units, Oral, Daily     Objective   VITAL SIGNS: 24 HR MIN & MAX LAST    Temp  Min: 97.3 °F (36.3 °C)  Max: 98.5 °F (36.9 °C)  98 °F (36.7 °C)        BP  Min: 116/71  Max: 131/85  (!) 119/56     Pulse  Min: 74  Max: 123  74     Resp  Min: 16  Max: 20  18    SpO2  Min: 96 %  Max: 98 %  96 %      Wt Readings from Last 3 Encounters:   07/20/24 93.3 kg (205 lb 9.6 oz)      Intake/Output - Last 3 Shifts         07/18 0700  07/19 0659 07/19 0700 07/20 0659 07/20 0700 07/21 0659    P.O.  240     I.V. (mL/kg)  1 (0)     Other  1500     Total Intake(mL/kg)  1741 (18.7)     Urine (mL/kg/hr)  700     Other  387     Total Output  1087     Net  +654                   Physical Exam  HENT:      Mouth/Throat:      Pharynx: Oropharynx is clear.   Cardiovascular:      Rate and Rhythm: Normal rate.   Pulmonary:      Effort: Pulmonary effort is normal.      Breath sounds: Normal breath sounds.   Abdominal:      General: Bowel sounds are normal. There is no distension.      Palpations: Abdomen is soft.      Tenderness: There is no abdominal tenderness.   Musculoskeletal:         General: No swelling.      Cervical back: Normal range of motion.   Neurological:      Mental Status: She is alert and oriented to person, place, and time.   Psychiatric:         Mood and Affect: Mood normal.          Recent Results (from the past 24 hour(s))   EKG 12-lead    Collection Time: 07/19/24 11:11 AM   Result Value Ref Range    QRS Duration 92 ms    [facility] QTC Calculation 456 ms   Lipase    Collection Time: 07/19/24 12:03 PM   Result Value Ref Range    Lipase Level 61 (H) <=60 U/L   Comprehensive Metabolic Panel    Collection Time: 07/19/24 12:03 PM   Result Value Ref Range    Sodium 135 (L) 136 - 145 mmol/L    Potassium 4.9 3.5 -  5.1 mmol/L    Chloride 104 98 - 107 mmol/L    CO2 15 (L) 23 - 31 mmol/L    Glucose 125 (H) 82 - 115 mg/dL    Blood Urea Nitrogen 171.8 (H) 9.8 - 20.1 mg/dL    Creatinine 8.22 (H) 0.55 - 1.02 mg/dL    Calcium 10.6 (H) 8.4 - 10.2 mg/dL    Protein Total 7.3 5.8 - 7.6 gm/dL    Albumin 4.2 3.4 - 4.8 g/dL    Globulin 3.1 2.4 - 3.5 gm/dL    Albumin/Globulin Ratio 1.4 1.1 - 2.0 ratio    Bilirubin Total 0.3 <=1.5 mg/dL    ALP 73 40 - 150 unit/L    ALT 9 0 - 55 unit/L    AST 13 5 - 34 unit/L    eGFR 5 mL/min/1.73/m2    Anion Gap 16.0 mEq/L    BUN/Creatinine Ratio 21    CBC with Differential    Collection Time: 07/19/24 12:03 PM   Result Value Ref Range    WBC 17.23 (H) 4.50 - 11.50 x10(3)/mcL    RBC 3.15 (L) 4.20 - 5.40 x10(6)/mcL    Hgb 10.0 (L) 12.0 - 16.0 g/dL    Hct 30.5 (L) 37.0 - 47.0 %    MCV 96.8 (H) 80.0 - 94.0 fL    MCH 31.7 (H) 27.0 - 31.0 pg    MCHC 32.8 (L) 33.0 - 36.0 g/dL    RDW 14.0 11.5 - 17.0 %    Platelet 259 130 - 400 x10(3)/mcL    MPV 11.5 (H) 7.4 - 10.4 fL    NRBC% 0.0 %   Manual Differential    Collection Time: 07/19/24 12:03 PM   Result Value Ref Range    WBC 17.23 x10(3)/mcL    Neutrophils % 61 %    Lymphs % 32 %    Monocytes % 2 %    Eosinophils % 4 %    Basophils % 1 %    Neutrophils Abs 10.5103 (H) 2.1 - 9.2 x10(3)/mcL    Lymphs Abs 5.5136 (H) 0.6 - 4.6 x10(3)/mcL    Monocytes Abs 0.3446 0.1 - 1.3 x10(3)/mcL    Eosinophils Abs 0.6892 0 - 0.9 x10(3)/mcL    Basophils Abs 0.1723 0 - 0.2 x10(3)/mcL    Platelets Normal Normal, Adequate    RBC Morph Abnormal (A) Normal    Poikilocytosis 1+ (A) (none)    Macrocytosis 1+ (A) (none)    Ovalocytes 1+ (A) (none)    Tear Drops 1+ (A) (none)   Magnesium    Collection Time: 07/19/24 12:03 PM   Result Value Ref Range    Magnesium Level 3.50 (H) 1.60 - 2.60 mg/dL   Phosphorus    Collection Time: 07/19/24 12:03 PM   Result Value Ref Range    Phosphorus Level 7.9 (H) 2.3 - 4.7 mg/dL   Hepatitis B Surface Antigen    Collection Time: 07/19/24 12:03 PM   Result Value  Ref Range    Hep BsAg Interp Nonreactive Nonreactive   APTT    Collection Time: 07/19/24  3:13 PM   Result Value Ref Range    PTT 29.0 23.2 - 33.7 seconds   Protime-INR    Collection Time: 07/19/24  3:13 PM   Result Value Ref Range    PT 13.8 12.5 - 14.5 seconds    INR 1.1 <=1.3   Urinalysis, Reflex to Urine Culture    Collection Time: 07/19/24  3:30 PM    Specimen: Urine   Result Value Ref Range    Color, UA Light-Yellow Yellow, Light-Yellow, Colorless, Straw, Dark-Yellow    Appearance, UA Clear Clear    Specific Gravity, UA 1.012 1.005 - 1.030    pH, UA 5.0 5.0 - 8.5    Protein, UA Negative Negative    Glucose, UA Normal Negative, Normal    Ketones, UA Negative Negative    Blood, UA Negative Negative    Bilirubin, UA Negative Negative    Urobilinogen, UA Normal 0.2, 1.0, Normal    Nitrites, UA Negative Negative    Leukocyte Esterase,  (A) Negative    RBC, UA 0-5 None Seen, 0-2, 3-5, 0-5 /HPF    WBC, UA 3-5 None Seen, 0-2, 3-5, 0-5 /HPF    Bacteria, UA Trace None Seen, Trace /HPF    Squamous Epithelial Cells, UA Trace None Seen /HPF    Mucous, UA Trace (A) None Seen /LPF    Hyaline Casts, UA 3-5 (A) None Seen /lpf   Comprehensive Metabolic Panel (CMP)    Collection Time: 07/20/24  3:36 AM   Result Value Ref Range    Sodium 135 (L) 136 - 145 mmol/L    Potassium 4.0 3.5 - 5.1 mmol/L    Chloride 103 98 - 107 mmol/L    CO2 20 (L) 23 - 31 mmol/L    Glucose 94 82 - 115 mg/dL    Blood Urea Nitrogen 86.6 (H) 9.8 - 20.1 mg/dL    Creatinine 4.39 (H) 0.55 - 1.02 mg/dL    Calcium 8.7 8.4 - 10.2 mg/dL    Protein Total 5.5 (L) 5.8 - 7.6 gm/dL    Albumin 3.5 3.4 - 4.8 g/dL    Globulin 2.0 (L) 2.4 - 3.5 gm/dL    Albumin/Globulin Ratio 1.8 1.1 - 2.0 ratio    Bilirubin Total 0.4 <=1.5 mg/dL    ALP 60 40 - 150 unit/L    ALT 8 0 - 55 unit/L    AST 14 5 - 34 unit/L    eGFR 10 mL/min/1.73/m2    Anion Gap 12.0 mEq/L    BUN/Creatinine Ratio 20    CBC with Differential    Collection Time: 07/20/24  3:36 AM   Result Value Ref Range     WBC 12.86 (H) 4.50 - 11.50 x10(3)/mcL    RBC 2.55 (L) 4.20 - 5.40 x10(6)/mcL    Hgb 8.2 (L) 12.0 - 16.0 g/dL    Hct 24.6 (L) 37.0 - 47.0 %    MCV 96.5 (H) 80.0 - 94.0 fL    MCH 32.2 (H) 27.0 - 31.0 pg    MCHC 33.3 33.0 - 36.0 g/dL    RDW 13.6 11.5 - 17.0 %    Platelet 184 130 - 400 x10(3)/mcL    MPV 11.6 (H) 7.4 - 10.4 fL    Neut % 39.2 %    Lymph % 51.9 %    Mono % 4.4 %    Eos % 3.8 %    Basophil % 0.4 %    Lymph # 6.68 (H) 0.6 - 4.6 x10(3)/mcL    Neut # 5.04 2.1 - 9.2 x10(3)/mcL    Mono # 0.56 0.1 - 1.3 x10(3)/mcL    Eos # 0.49 0 - 0.9 x10(3)/mcL    Baso # 0.05 <=0.2 x10(3)/mcL    IG# 0.04 0 - 0.04 x10(3)/mcL    IG% 0.3 %    NRBC% 0.0 %   Urinalysis, Reflex to Urine Culture    Collection Time: 07/20/24  7:23 AM    Specimen: Urine   Result Value Ref Range    Color, UA Colorless Yellow, Light-Yellow, Colorless, Straw, Dark-Yellow    Appearance, UA Clear Clear    Specific Gravity, UA 1.007 1.005 - 1.030    pH, UA 5.5 5.0 - 8.5    Protein, UA Negative Negative    Glucose, UA Normal Negative, Normal    Ketones, UA Negative Negative    Blood, UA Negative Negative    Bilirubin, UA Negative Negative    Urobilinogen, UA Normal 0.2, 1.0, Normal    Nitrites, UA Negative Negative    Leukocyte Esterase, UA Negative Negative    RBC, UA None Seen None Seen, 0-2, 3-5, 0-5 /HPF    WBC, UA 0-5 None Seen, 0-2, 3-5, 0-5 /HPF    Bacteria, UA Trace None Seen, Trace /HPF    Squamous Epithelial Cells, UA Trace None Seen /HPF    Mucous, UA Trace (A) None Seen /LPF    Hyaline Casts, UA 3-5 (A) None Seen /lpf      X-Ray Chest 1 View    Result Date: 7/19/2024  EXAMINATION: XR CHEST 1 VIEW CLINICAL HISTORY: Confirm placement of HD catheter; TECHNIQUE: Single frontal view of the chest was performed. COMPARISON: 07/19/2024 FINDINGS: LINES AND TUBES: Right IJ CVC tip projects over the SVC.  EKG/telemetry leads overlie the chest. MEDIASTINUM AND BLAKE: The cardiac silhouette is normal. Aortic atherosclerosis. LUNGS: No lobar consolidation. No  edema. PLEURA:No pleural effusion. No pneumothorax. OTHER: Postop left shoulder arthroplasty.  Postop sternotomy.     Right IJ CVC tip projects over the SVC. Electronically signed by: Ladi Flores Date:    07/19/2024 Time:    18:08    CT Abdomen Pelvis  Without Contrast    Result Date: 7/19/2024  EXAMINATION: CT ABDOMEN PELVIS WITHOUT CONTRAST CLINICAL HISTORY: Bowel obstruction suspected;Nausea/vomiting; TECHNIQUE: Helically acquired axial images, sagittal and coronal reformations were obtained from the lung bases to the pubic symphysis without the IV administration of contrast. Automated tube current modulation, weight-based exposure dosing, and/or iterative reconstruction technique utilized to reach lowest reasonably achievable exposure rate. DLP: 1713 mGy*cm COMPARISON: No relevant prior available for comparison at the time of dictation. FINDINGS: HEART: Normal in size. No pericardial effusion. LUNG BASES: 6.5 mm left lower lobe pulmonary nodule.  For a solid nodule 6-8 mm, Fleischner Society 2017 guidelines recommend follow up with non-contrast chest CT at 6-12 months and 18-24 months after discovery. LIVER: Normal attenuation. No appreciable focal hepatic lesion. BILIARY: Gallbladder is surgically absent. PANCREAS: No inflammatory change. SPLEEN: Normal in size ADRENALS: No mass. KIDNEYS/URETERS: No renal or ureteral calculus. No hydronephrosis. GI TRACT/MESENTERY: Evaluation of the bowel is limited without contrast. No evidence of bowel obstruction.   Colonic diverticulosis without acute inflammatory change. PERITONEUM: No free fluid.No free air. LYMPH NODES: No enlarged lymph nodes by size criteria. VASCULATURE: Aortoiliac atherosclerosis. BLADDER: Normal appearance given degree of distention. REPRODUCTIVE ORGANS: Uterus is surgically absent.  There is a 5 cm cyst midline above the vaginal cuff, likely left adnexal. ABDOMINAL WALL: Unremarkable. BONES: Degenerative change at the thoracolumbar spine.   Multilevel disc space narrowing and facet arthropathy.     1. No evidence of bowel obstruction 2. Small left lower lobe pulmonary nodule.  In absence of outside prior for comparison, for a solid nodule 6-8 mm, Fleischner Society 2017 guidelines recommend follow up with non-contrast chest CT at 6-12 months and 18-24 months after discovery. Electronically signed by: Ladi Flores Date:    07/19/2024 Time:    14:23    X-Ray Chest AP Portable    Result Date: 7/19/2024  EXAMINATION XR CHEST AP PORTABLE CLINICAL HISTORY Epigastric pain TECHNIQUE A total of 1 frontal image(s) of the chest. COMPARISON None available at the time of initial interpretation. FINDINGS Lines/tubes/devices: none present The cardiomediastinal silhouette and central pulmonary vasculature are unremarkable for utilized technique.  The trachea is midline. There is no lobar consolidation, pleural effusion, or pneumothorax.  Calcified right lung granuloma is incidentally noted. There regional osseous degenerative changes with partially visualized left shoulder arthroplasty components.  No convincing acutely displaced fracture. IMPRESSION No convincing acute radiographic abnormality. Electronically signed by: Sanjay Brunner Date:    07/19/2024 Time:    11:55     ASSESSMENT PLAN    Uremia, ESRD - Urgent HD initiated yesterday for symptomatic uremia, feeling much better today, N/V resolved, tolerating po diet. CKD due to NSAID's vs IVIG toxicity per pt's nephrologist in [place] (no anatomical abnormalities on CT).  For HD #2 today, with plans for tunneled HD cath placement and HD # 3 Monday.  Will consult [facility] Monday for outpt HD unit placement as well.  Pt, daughter counseled, agree to the plan.  She appears to be poor candidate for PD considering prior extensive abdominal surgeries, immobility  Anemia - Hb down 8.2 p volume resuscitation, will check Fe profile, order EPO 20 K U TIW for now awaiting oncology guidance for EPO use in CLL  CLL - pt  waiting to establist with local oncologist to continue therapy  Met acidosis - due to uremia, quickly correcting after HD initiation  MBD - check phos, PTH in am to guide calcitriol, binder needs  Mild malnutrition - supplement shakes ordered, consider more liberal diet  HTN, CAD - normotensive, check lipids, CAD mngt as per primary, she will need to establish with local cardiologist  Anx/Dep - consider resuming home meds, as per primary  Spinal stenosis, OA/DJD, immobility - PT consulted, ok for NSAID's as needed now that she is on HD

## 2024-07-20 NOTE — PT/OT/SLP EVAL
Physical Therapy Evaluation    Patient Name:  Leonor Torrez   MRN:  [id number]    Recommendations:     Discharge therapy intensity: Low Intensity Therapy   Discharge Equipment Recommendations: none   Barriers to discharge: Impaired mobility and Ongoing medical needs    Assessment:     Leonor Torrez is a 78 y.o. female admitted with a medical diagnosis of Abdominal pain, CKD, Hx of leukemia.  She presents with the following impairments/functional limitations: weakness, impaired endurance, impaired functional mobility, decreased lower extremity function, impaired cardiopulmonary response to activity.    Pt CGA for all mobility today. Able to take 4-5 steps forward and back w/ RW and CGA, bed to chair transfer using step transfer. At baseline pt uses manual wheelchair in her home and a powerchair outside. States she is independent with transfers and very minimal ambulation. Pt mobilizing fairly well today and will most likely only need a few more PT visits while here in the hospital to ensure safety with transfers.    Rehab Prognosis: Good; patient would benefit from acute skilled PT services to address these deficits and reach maximum level of function.    Recent Surgery: * No surgery found *      Plan:     During this hospitalization, patient would benefit from acute PT services 5 x/week to address the identified rehab impairments via gait training, therapeutic activities, therapeutic exercises, neuromuscular re-education and progress toward the following goals:    Plan of Care Expires:  08/20/24    Subjective     Chief Complaint: Discomfort at dialysis catheter site  Patient/Family Comments/goals: none stated  Pain/Comfort:  Location - Side 1: Right  Location 1: neck  Pain Addressed 1: Reposition, Distraction    Patients cultural, spiritual, Voodoo conflicts given the current situation: no    Living Environment:  Pt lives alone in [facility]  Prior to admission, patients level of function was mod independent.   Equipment used at home: wheelchair, power chair, walker, rolling, grab bar, shower chair.  DME owned (not currently used): none.  Upon discharge, patient will have assistance from daughter.    Objective:     Communicated with nurse prior to session.  Patient found HOB elevated with PureWick, oxygen, telemetry, peripheral IV  upon PT entry to room.    General Precautions: Standard, fall  Orthopedic Precautions:N/A   Braces: N/A  Respiratory Status: Nasal cannula, flow 4 L/min. Pt not wearing oxygen upon PT entry, O2 sats reading 87%. Oxygen applied and sats quickly increased to 100%  Blood Pressure: 130/66, HR 84      Exams:  RLE ROM: WNL  RLE Strength: Generalized weakness; 3+/5 grossly  LLE ROM: WNL  LLE Strength: Generalized weakness; 3+/5 grossly  Skin integrity: Visible skin intact      Functional Mobility:  Bed Mobility:     Supine to Sit: contact guard assistance  Transfers:     Sit to Stand:  contact guard assistance with rolling walker  Bed to Chair: contact guard assistance with  rolling walker  using  Step Transfer  Gait: 4-5 steps forward and back w/ RW CGA      AM-PAC 6 CLICK MOBILITY  Total Score:19       Treatment & Education:  Education Provided:  Role and goals of PT, transfer training, bed mobility, gait training, balance training, safety awareness, assistive device, strengthening exercises, and importance of participating in PT to return to PLOF.        Patient left up in chair with all lines intact and call button in reach.    GOALS:   Multidisciplinary Problems       Physical Therapy Goals          Problem: Physical Therapy    Goal Priority Disciplines Outcome Goal Variances Interventions   Physical Therapy Goal     PT, PT/OT Progressing     Description: Goals to be met by: 24     Patient will increase functional independence with mobility by performin. Supine to sit with Modified [place]  2. Sit to stand transfer with Modified [place]  3. Bed to chair transfer with Modified  [place] using Rolling Walker  4. Gait  x 20 feet with Stand-by Assistance using Rolling Walker.                          History:     Past Medical History:   Diagnosis Date    CKD (chronic kidney disease)     Leukemia        No past surgical history on file.    Time Tracking:     PT Received On: 07/20/24  PT Start Time: 1139     PT Stop Time: 1155  PT Total Time (min): 16 min     Billable Minutes: Evaluation 16      07/20/2024

## 2024-07-20 NOTE — NURSING
Nurses Note -- 4 Eyes      7/19/2024   10:25 PM      Skin assessed during: Admit      [x] No Altered Skin Integrity Present    [x]Prevention Measures Documented      [] Yes- Altered Skin Integrity Present or Discovered   [] LDA Added if Not in Epic (Describe Wound)   [] New Altered Skin Integrity was Present on Admit and Documented in LDA   [] Wound Image Taken    Wound Care Consulted? No    Attending Nurse:  Serene Gonzales RN    Second RN/Staff Member:   Nessa Zuluaga RN

## 2024-07-20 NOTE — PLAN OF CARE
Problem: Hemodialysis  Goal: Safe, Effective Therapy Delivery  Outcome: Progressing  Goal: Effective Tissue Perfusion  Outcome: Progressing  Goal: Absence of Infection Signs and Symptoms  Outcome: Progressing     Problem: Adult Inpatient Plan of Care  Goal: Plan of Care Review  Outcome: Progressing  Goal: Patient-Specific Goal (Individualized)  Outcome: Progressing  Goal: Absence of Hospital-Acquired Illness or Injury  Outcome: Progressing  Goal: Optimal Comfort and Wellbeing  Outcome: Progressing  Goal: Readiness for Transition of Care  Outcome: Progressing     Problem: Infection  Goal: Absence of Infection Signs and Symptoms  Outcome: Progressing     Problem: Skin Injury Risk Increased  Goal: Skin Health and Integrity  Outcome: Progressing

## 2024-07-20 NOTE — PROGRESS NOTES
07/19/24 2150        Hemodialysis Catheter right internal jugular   No placement date or time found.   Hemodialysis Catheter Type: Temporary catheter  Location: right internal jugular   Site Assessment No drainage;No redness;No swelling;No warmth   Line Securement Device Secured with sutures   Dressing Type CHG impregnated dressing/sponge;Transparent (Tegaderm)   Dressing Status Clean;Dry;Intact   Dressing Intervention Integrity maintained   Post-Hemodialysis Assessment   Blood Volume Processed (Liters) 34 L   Dialyzer Clearance Lightly streaked   Duration of Treatment 120 minutes   Additional Fluid Intake (mL) 1500 mL   Total UF (mL) 387 mL   Patient Response to Treatment Pt not tolerating 0.5L net uf, 500 bolus x 2 given d/t low sbp, pt dizzy, sweating, pt tolerated remainder of tx, Dr Fitch aware   Post-Hemodialysis Comments Tx complete, pt reinfused, cvc deaccessed per p&p, new sterile caps applied

## 2024-07-20 NOTE — CONSULTS
Inpatient Nutrition Assessment    Admit Date: 7/19/2024   Total duration of encounter: 1 day   Patient Age: 78 y.o.    Nutrition Recommendation/Prescription     Continue renal diet as tolerated  Novasource Renal daily provides 475 kcal, 22 gm protein per container    Communication of Recommendations: reviewed with patient    Nutrition Assessment     Malnutrition Assessment/Nutrition-Focused Physical Exam    Malnutrition Context: acute illness or injury (07/20/24 1809)  Malnutrition Level: severe (07/20/24 1809)  Energy Intake (Malnutrition): less than or equal to 50% for greater than or equal to 5 days (07/20/24 1809)  Weight Loss (Malnutrition): greater than 2% in 1 week (07/20/24 1809)  Subcutaneous Fat (Malnutrition):  (does not meet criteria) (07/20/24 1809)           Muscle Mass (Malnutrition):  (does not meet criteria) (07/20/24 1809)                                   A minimum of two characteristics is recommended for diagnosis of either severe or non-severe malnutrition.    Chart Review    Reason Seen: physician consult for reduced oral intake    Malnutrition Screening Tool Results   Have you recently lost weight without trying?: Yes: 14-23 lbs  Have you been eating poorly because of a decreased appetite?: Yes   MST Score: 3   Diagnosis:  CKD stage 5 needing initiation of dialysis  Left lower lobe pulmonary nodule, small  Leukocytosis ? Gastroenteritis : resolved   Macrocytic anemia, stable   Elevated lipase, mild non specific     Relevant Medical History: hypertension, hyperlipidemia, atrial fibrillation not on anticoagulation, coronary artery disease status post stents and CABG not on Plavix, CKD stage 5, CVA, CLL previously undergoing immunoglobulin treatments every 2 months     Scheduled Medications:  epoetin shahnaz, 20,000 Units, Every Tues, Thurs, Sat  mupirocin, , BID  vitamin renal formula (B-complex-vitamin c-folic acid), 1 capsule, Daily    Continuous Infusions:   PRN Medications:  acetaminophen, 650  "mg, Q8H PRN  acetaminophen, 650 mg, Q4H PRN  albuterol-ipratropium, 3 mL, Q4H PRN  ondansetron, 4 mg, Q4H PRN  sodium chloride 0.9%, 250 mL, PRN  sodium chloride 0.9%, 10 mL, Q12H PRN    Calorie Containing IV Medications: no significant kcals from medications at this time    Recent Labs   Lab 24  1203 24  0336   * 135*   K 4.9 4.0   CALCIUM 10.6* 8.7   PHOS 7.9*  --    MG 3.50*  --    CO2 15* 20*   .8* 86.6*   CREATININE 8.22* 4.39*   EGFRNORACEVR 5 10   GLUCOSE 125* 94   BILITOT 0.3 0.4   ALKPHOS 73 60   ALT 9 8   AST 13 14   ALBUMIN 4.2 3.5   LIPASE 61*  --    WBC 17.23  17.23* 12.86*   HGB 10.0* 8.2*   HCT 30.5* 24.6*     Nutrition Orders:  Diet Renal On Dialysis  Diet NPO Except for: Medication  Dietary nutrition supplements [employer] Renal - [employer]; All Meals    Appetite/Oral Intake: fair/50-75% of meals  Factors Affecting Nutritional Intake: decreased appetite, nausea, and vomiting  Social Needs Impacting Access to Food: none identified  Food/Christianity/Cultural Preferences: none reported  Food Allergies: no known food allergies  Last Bowel Movement: 24  Wound(s):      Comments    24  pt reports feeling much better today than prior to admit. Had 3 weeks of n/v and decreased appetite and intake with 25# weight loss unintentionally. Started on dialysis and plans to continue post d/c. Requesting renal diet education. Agreeable to adding Novasource Renal daily    Anthropometrics    Height: 5' 2.01" (157.5 cm), Height Method: Estimated  Last Weight: 93.3 kg (205 lb 9.6 oz) (24 1808), Weight Method: Bed Scale  BMI (Calculated): 37.6  BMI Classification: obese grade II (BMI 35-39.9)     Ideal Body Weight (IBW), Female: 110.05 lb     % Ideal Body Weight, Female (lb): 186.82 %                    Usual Body Weight (UBW), k.2 kg  % Usual Body Weight: 91.44  % Weight Change From Usual Weight: -8.75 %  Usual Weight Provided By: patient    Wt Readings from Last 5 " Encounters:   07/20/24 93.3 kg (205 lb 9.6 oz)     Weight Change(s) Since Admission:   Wt Readings from Last 1 Encounters:   07/20/24 1808 93.3 kg (205 lb 9.6 oz)   07/20/24 0119 93.3 kg (205 lb 9.6 oz)   07/19/24 1110 89.8 kg (198 lb)   Admit Weight: 89.8 kg (198 lb) (07/19/24 1110), Weight Method: Stated    Estimated Needs    Weight Used For Calorie Calculations: 93.3 kg (205 lb 11 oz)  Energy Calorie Requirements (kcal): 1640 kcal (1.2 stress factor)  Energy Need Method: [place]-St Jeor  Weight Used For Protein Calculations: 93.3 kg (205 lb 11 oz)  Protein Requirements: 93gm (1 gm/kg)  Fluid Requirements (mL): 1,000ml + ouput (renal on HD)        Enteral Nutrition     Patient not receiving enteral nutrition at this time.    Parenteral Nutrition     Patient not receiving parenteral nutrition support at this time.    Evaluation of Received Nutrient Intake    Calories: meeting estimated needs  Protein: meeting estimated needs    Patient Education     Education Provided: renal diet  Teaching Method: printed materials  Comprehension: verbalizes understanding  Barriers to Learning: none evident  Expected Compliance: fair  Comments: All questions were answered and dietitian's contact information was provided.     Nutrition Diagnosis     PES: Unintended weight loss related to suboptimal protein/energy intake as evidenced by 25# weight loss unintentionally x 3 weeks. (new)     PES: Severe acute disease or injury related malnutrition related to suboptimal protein/energy intake as evidenced by less than or equal to 50% needs met for greater than or equal to 5 days and greater than 2% weight loss in 1 week. (new)    Nutrition Interventions     Intervention(s): commercial beverage and collaboration with other providers    Goal: Consume % of meals/snacks by follow-up. (new)  Goal: Consume % of oral supplements by follow-up. (new)    Nutrition Goals & Monitoring     Dietitian will monitor: food and beverage intake,  weight change, electrolyte/renal panel, and gastrointestinal profile  Discharge planning: continue renal diet  Nutrition Risk/Follow-Up: high (follow-up in 1-4 days)   Please consult if re-assessment needed sooner.

## 2024-07-20 NOTE — PLAN OF CARE
Problem: Hemodialysis  Goal: Safe, Effective Therapy Delivery  Outcome: Progressing  Goal: Effective Tissue Perfusion  Outcome: Progressing  Goal: Absence of Infection Signs and Symptoms  Outcome: Progressing     Problem: Adult Inpatient Plan of Care  Goal: Plan of Care Review  Outcome: Progressing  Flowsheets (Taken 7/20/2024 0115)  Plan of Care Reviewed With: patient  Goal: Patient-Specific Goal (Individualized)  Outcome: Progressing  Goal: Absence of Hospital-Acquired Illness or Injury  Outcome: Progressing  Intervention: Identify and Manage Fall Risk  Flowsheets (Taken 7/20/2024 0115)  Safety Promotion/Fall Prevention:   assistive device/personal item within reach   Fall Risk reviewed with patient/family   Fall Risk signage in place   lighting adjusted   nonskid shoes/socks when out of bed   side rails raised x 2   instructed to call staff for mobility  Intervention: Prevent Skin Injury  Flowsheets (Taken 7/20/2024 0115)  Body Position:   supine   position changed independently  Skin Protection:   protective footwear used   silicone foam dressing in place  Device Skin Pressure Protection:   absorbent pad utilized/changed   tubing/devices free from skin contact  Intervention: Prevent and Manage VTE (Venous Thromboembolism) Risk  Flowsheets (Taken 7/20/2024 0115)  VTE Prevention/Management: bleeding risk assessed  Intervention: Prevent Infection  Flowsheets (Taken 7/20/2024 0115)  Infection Prevention:   environmental surveillance performed   equipment surfaces disinfected   hand hygiene promoted   personal protective equipment utilized   rest/sleep promoted   single patient room provided  Goal: Optimal Comfort and Wellbeing  Outcome: Progressing  Intervention: Monitor Pain and Promote Comfort  Flowsheets (Taken 7/20/2024 0115)  Pain Management Interventions:   care clustered   quiet environment facilitated  Intervention: Provide Person-Centered Care  Flowsheets (Taken 7/20/2024 0115)  Trust  Relationship/Rapport:   care explained   choices provided   emotional support provided   empathic listening provided   questions answered   thoughts/feelings acknowledged   reassurance provided   questions encouraged  Goal: Readiness for Transition of Care  Outcome: Progressing     Problem: Infection  Goal: Absence of Infection Signs and Symptoms  Outcome: Progressing  Intervention: Prevent or Manage Infection  Flowsheets (Taken 7/20/2024 0115)  Infection Management: aseptic technique maintained     Problem: Skin Injury Risk Increased  Goal: Skin Health and Integrity  Outcome: Progressing

## 2024-07-20 NOTE — PROGRESS NOTES
[facility] Hospital Medicine Progress Note        Chief Complaint: Inpatient Follow-up for CKD needing HD     HPI:   Leonor Torrez is a 78 y.o. White female with a past medical history of hypertension, hyperlipidemia, atrial fibrillation not on anticoagulation, coronary artery disease status post stents and CABG not on Plavix, CKD stage 5, CVA, CLL previously undergoing immunoglobulin treatments every 2 months.  Patient recently moved to [place] from [place] and has been lost to follow-up for immunoglobulin treatments [facility] and [facility] to establish care with oncology to continued treatments.      The patient presented to [facility] on 7/19/2024 with a primary complaint of decreased urinary output.  Patient was seen at urgent care in [place] for complaints.  Provider there was concern of a bowel obstruction therefore it was recommended patient present to the ED. associated symptoms include nausea and vomiting for the last 3 weeks, decreased appetite and little to no urination for the last 4 days.  She denies complaints of fever, diarrhea, chest pain, shortness a breath, swelling to bilateral lower extremities, flank pain.  She reports talking about initiating dialysis in the past but never had an AV fistula placement.  Daughter at bedside states patient is wheelchair-bound at home.  She does have hip pain in which she needs hip surgery for but was told not to be a surgical candidate.  Patient has been getting up without the wheelchair and having several falls at home.     Upon presentation to the ED, temperature 97.3° F, heart rate 114, blood pressure 116/74, respiratory rate 20 and SpO2 97% on room air.  Labs with WBC 17.23, H&H 10/30.5, MCV 96.8, CO2 15, BUN/creatinine 171.8/8.22, glucose 125, lipase 61.  No prior labs for comparison.  EKG accelerated junction rhythm.  Chest x-ray no convincing acute abnormality. CTA of the abdomen and pelvis revealed no  evidence of bowel obstruction but a small left lower lobe pulmonary nodule with recommendations of a noncontrast CT at 6-12 months and again at 18-24 months.  In ED patient received IV fluid hydration, Tylenol, Zofran and Phenergan.  Patient I    HD cath placed and HD started per renal team.    Interval Hx:   Patient today awake and comfortable. Denies any SOB, chest pain, fever or chills. Eating well. Tolerated HD.    Case was discussed with patient's nurse and  on the floor.    Objective/physical exam:  General: In no acute distress  Chest: Clear to auscultation bilaterally  Heart: RRR, +S1, S2, no appreciable murmur  Abdomen: Soft, nontender, BS +  MSK: Warm, no lower extremity edema, no clubbing or cyanosis  Neurologic: Alert and oriented x4, Cranial nerve II-XII intact, Strength 5/5 in all 4 extremities    VITAL SIGNS: 24 HRS MIN & MAX LAST   Temp  Min: 97.7 °F (36.5 °C)  Max: 98.5 °F (36.9 °C) 98.2 °F (36.8 °C)   BP  Min: 116/71  Max: 131/85 130/66   Pulse  Min: 74  Max: 123  94   Resp  Min: 16  Max: 18 18   SpO2  Min: 96 %  Max: 100 % 100 %     I have reviewed the following labs:  Recent Labs   Lab 07/19/24  1203 07/20/24  0336   WBC 17.23  17.23* 12.86*   RBC 3.15* 2.55*   HGB 10.0* 8.2*   HCT 30.5* 24.6*   MCV 96.8* 96.5*   MCH 31.7* 32.2*   MCHC 32.8* 33.3   RDW 14.0 13.6    184   MPV 11.5* 11.6*     Recent Labs   Lab 07/19/24  1203 07/20/24  0336   * 135*   K 4.9 4.0    103   CO2 15* 20*   .8* 86.6*   CREATININE 8.22* 4.39*   CALCIUM 10.6* 8.7   MG 3.50*  --    ALBUMIN 4.2 3.5   ALKPHOS 73 60   ALT 9 8   AST 13 14   BILITOT 0.3 0.4     Microbiology Results (last 7 days)       Procedure Component Value Units Date/Time    Stool Culture [[id number]]     Order Status: Sent Specimen: Stool     Blood Culture [[id number]] Collected: 07/19/24 1801    Order Status: Resulted Specimen: Blood Updated: 07/19/24 1824    Blood Culture [[id number]] Collected: 07/19/24 1801    Order  Status: Resulted Specimen: Blood Updated: 07/19/24 8046             See below for Radiology    Assessment/Plan:  CKD stage 5 needing initiation of dialysis  Left lower lobe pulmonary nodule, small  Leukocytosis ? Gastroenteritis : resolved   Macrocytic anemia, stable   Elevated lipase, mild non specific   History of leukemia- CLL    Plan:  Patient feeling much better today. Tolerated HD  Today BUN 86, Crt 4.39  Continue HD per renal team   Hb today 8.2, will monitor   No signs of sepsis. WBC 12    Continue supportive care     Labs in am     VTE prophylaxis: SCD    Patient condition:  Fair    Anticipated discharge and Disposition:   Home       All diagnosis and differential diagnosis have been reviewed; assessment and plan has been documented; I have personally reviewed the labs and test results that are presently available; I have reviewed the patients medication list; I have reviewed the consulting providers response and recommendations. I have reviewed or attempted to review medical records based upon their availability    All of the patient's questions have been  addressed and answered. Patient's is agreeable to the above stated plan. I will continue to monitor closely and make adjustments to medical management as needed.    Portions of this note dictated using EMR integrated voice recognition software, and may be subject to voice recognition errors not corrected at proofreading. Please contact writer for clarification if needed.   _____________________________________________________________________    Malnutrition Status:    Scheduled Med:   epoetin shahnaz  20,000 Units Subcutaneous Every Tues, Thurs, Sat    mupirocin   Nasal BID    vitamin renal formula (B-complex-vitamin c-folic acid)  1 capsule Oral Daily      Continuous Infusions:     PRN Meds:    Current Facility-Administered Medications:     acetaminophen, 650 mg, Oral, Q8H PRN    acetaminophen, 650 mg, Oral, Q4H PRN    ondansetron, 4 mg, Intravenous, Q4H  PRN    sodium chloride 0.9%, 250 mL, Intravenous, PRN    sodium chloride 0.9%, 10 mL, Intravenous, Q12H PRN     Radiology:  I have personally reviewed the following imaging and agree with the radiologist.     Echo    Left Ventricle: The left ventricle is normal in size. Normal wall   thickness. There is normal systolic function with a visually estimated   ejection fraction of 55 - 60%.    Right Ventricle: Severe right ventricular enlargement. Systolic   function is normal.    Aortic Valve: The aortic valve is a trileaflet valve.    Mitral Valve: There is no stenosis. The mean pressure gradient across   the mitral valve is 3 mmHg at a heart rate of 78 bpm. There is mild   regurgitation.    Tricuspid Valve: There is mild to moderate regurgitation.    IVC/SVC: Normal venous pressure at 3 mmHg.      Jairo Waller MD  Department of Hospital Medicine   [facility]   07/20/2024

## 2024-07-20 NOTE — NURSING
07/20/24 1707   Post-Hemodialysis Assessment   Blood Volume Processed (Liters) 53.4 L   Dialyzer Clearance Lightly streaked   Duration of Treatment 180 minutes   Total UF (mL) 0 mL   Patient Response to Treatment tolerated well

## 2024-07-21 LAB
25(OH)D3+25(OH)D2 SERPL-MCNC: 62 NG/ML (ref 30–80)
ADV 40+41 DNA STL QL NAA+NON-PROBE: NOT DETECTED
ALBUMIN SERPL-MCNC: 3.3 G/DL (ref 3.4–4.8)
ASTRO TYP 1-8 RNA STL QL NAA+NON-PROBE: NOT DETECTED
BASOPHILS # BLD AUTO: 0.05 X10(3)/MCL
BASOPHILS NFR BLD AUTO: 0.5 %
BUN SERPL-MCNC: 34.7 MG/DL (ref 9.8–20.1)
C CAYETANENSIS DNA STL QL NAA+NON-PROBE: NOT DETECTED
C COLI+JEJ+UPSA DNA STL QL NAA+NON-PROBE: NOT DETECTED
CALCIUM SERPL-MCNC: 8.3 MG/DL (ref 8.4–10.2)
CHLORIDE SERPL-SCNC: 102 MMOL/L (ref 98–107)
CHOLEST SERPL-MCNC: 164 MG/DL
CHOLEST/HDLC SERPL: 4 {RATIO} (ref 0–5)
CO2 SERPL-SCNC: 24 MMOL/L (ref 23–31)
CREAT SERPL-MCNC: 2.89 MG/DL (ref 0.55–1.02)
CRYPTOSP DNA STL QL NAA+NON-PROBE: NOT DETECTED
E HISTOLYT DNA STL QL NAA+NON-PROBE: NOT DETECTED
EAEC PAA PLAS AGGR+AATA ST NAA+NON-PRB: NOT DETECTED
EC STX1+STX2 GENES STL QL NAA+NON-PROBE: NOT DETECTED
EOSINOPHIL # BLD AUTO: 0.34 X10(3)/MCL (ref 0–0.9)
EOSINOPHIL NFR BLD AUTO: 3.6 %
EPEC EAE GENE STL QL NAA+NON-PROBE: NOT DETECTED
ERYTHROCYTE [DISTWIDTH] IN BLOOD BY AUTOMATED COUNT: 14.1 % (ref 11.5–17)
ETEC LTA+ST1A+ST1B TOX ST NAA+NON-PROBE: NOT DETECTED
G LAMBLIA DNA STL QL NAA+NON-PROBE: NOT DETECTED
GFR SERPLBLD CREATININE-BSD FMLA CKD-EPI: 16 ML/MIN/1.73/M2
GLUCOSE SERPL-MCNC: 102 MG/DL (ref 82–115)
HCT VFR BLD AUTO: 26.6 % (ref 37–47)
HDLC SERPL-MCNC: 39 MG/DL (ref 35–60)
HGB BLD-MCNC: 8.7 G/DL (ref 12–16)
IMM GRANULOCYTES # BLD AUTO: 0.02 X10(3)/MCL (ref 0–0.04)
IMM GRANULOCYTES NFR BLD AUTO: 0.2 %
LDLC SERPL CALC-MCNC: 110 MG/DL (ref 50–140)
LYMPHOCYTES # BLD AUTO: 4.04 X10(3)/MCL (ref 0.6–4.6)
LYMPHOCYTES NFR BLD AUTO: 42.9 %
MCH RBC QN AUTO: 32.5 PG (ref 27–31)
MCHC RBC AUTO-ENTMCNC: 32.7 G/DL (ref 33–36)
MCV RBC AUTO: 99.3 FL (ref 80–94)
MONOCYTES # BLD AUTO: 0.77 X10(3)/MCL (ref 0.1–1.3)
MONOCYTES NFR BLD AUTO: 8.2 %
NEUTROPHILS # BLD AUTO: 4.19 X10(3)/MCL (ref 2.1–9.2)
NEUTROPHILS NFR BLD AUTO: 44.6 %
NOROVIRUS GI+II RNA STL QL NAA+NON-PROBE: NOT DETECTED
NRBC BLD AUTO-RTO: 0 %
P SHIGELLOIDES DNA STL QL NAA+NON-PROBE: NOT DETECTED
PHOSPHATE SERPL-MCNC: 3.6 MG/DL (ref 2.3–4.7)
PLATELET # BLD AUTO: 161 X10(3)/MCL (ref 130–400)
PMV BLD AUTO: 11.3 FL (ref 7.4–10.4)
POTASSIUM SERPL-SCNC: 4.3 MMOL/L (ref 3.5–5.1)
PTH-INTACT SERPL-MCNC: 223.3 PG/ML (ref 8.7–77)
RBC # BLD AUTO: 2.68 X10(6)/MCL (ref 4.2–5.4)
RVA RNA STL QL NAA+NON-PROBE: NOT DETECTED
S ENT+BONG DNA STL QL NAA+NON-PROBE: NOT DETECTED
SAPO I+II+IV+V RNA STL QL NAA+NON-PROBE: NOT DETECTED
SHIGELLA SP+EIEC IPAH ST NAA+NON-PROBE: NOT DETECTED
SODIUM SERPL-SCNC: 136 MMOL/L (ref 136–145)
TRIGL SERPL-MCNC: 75 MG/DL (ref 37–140)
URATE SERPL-MCNC: 1.5 MG/DL (ref 2.6–6)
V CHOL+PARA+VUL DNA STL QL NAA+NON-PROBE: NOT DETECTED
V CHOLERAE DNA STL QL NAA+NON-PROBE: NOT DETECTED
VLDLC SERPL CALC-MCNC: 15 MG/DL
WBC # BLD AUTO: 9.41 X10(3)/MCL (ref 4.5–11.5)
Y ENTEROCOL DNA STL QL NAA+NON-PROBE: NOT DETECTED

## 2024-07-21 PROCEDURE — 87077 CULTURE AEROBIC IDENTIFY: CPT | Performed by: INTERNAL MEDICINE

## 2024-07-21 PROCEDURE — 27000221 HC OXYGEN, UP TO 24 HOURS

## 2024-07-21 PROCEDURE — 25000003 PHARM REV CODE 250: Performed by: INTERNAL MEDICINE

## 2024-07-21 PROCEDURE — 11000001 HC ACUTE MED/SURG PRIVATE ROOM

## 2024-07-21 PROCEDURE — 36415 COLL VENOUS BLD VENIPUNCTURE: CPT | Performed by: INTERNAL MEDICINE

## 2024-07-21 PROCEDURE — 94640 AIRWAY INHALATION TREATMENT: CPT

## 2024-07-21 PROCEDURE — 84550 ASSAY OF BLOOD/URIC ACID: CPT | Performed by: INTERNAL MEDICINE

## 2024-07-21 PROCEDURE — 80061 LIPID PANEL: CPT | Performed by: INTERNAL MEDICINE

## 2024-07-21 PROCEDURE — 87427 SHIGA-LIKE TOXIN AG IA: CPT | Performed by: INTERNAL MEDICINE

## 2024-07-21 PROCEDURE — 83970 ASSAY OF PARATHORMONE: CPT | Performed by: INTERNAL MEDICINE

## 2024-07-21 PROCEDURE — 80069 RENAL FUNCTION PANEL: CPT | Performed by: INTERNAL MEDICINE

## 2024-07-21 PROCEDURE — 63600175 PHARM REV CODE 636 W HCPCS: Performed by: PHYSICIAN ASSISTANT

## 2024-07-21 PROCEDURE — 87507 IADNA-DNA/RNA PROBE TQ 12-25: CPT | Performed by: INTERNAL MEDICINE

## 2024-07-21 PROCEDURE — 99900035 HC TECH TIME PER 15 MIN (STAT)

## 2024-07-21 PROCEDURE — 94760 N-INVAS EAR/PLS OXIMETRY 1: CPT

## 2024-07-21 PROCEDURE — 25000242 PHARM REV CODE 250 ALT 637 W/ HCPCS: Performed by: INTERNAL MEDICINE

## 2024-07-21 PROCEDURE — 99900031 HC PATIENT EDUCATION (STAT)

## 2024-07-21 PROCEDURE — 25000003 PHARM REV CODE 250: Performed by: PHYSICIAN ASSISTANT

## 2024-07-21 PROCEDURE — 85025 COMPLETE CBC W/AUTO DIFF WBC: CPT | Performed by: INTERNAL MEDICINE

## 2024-07-21 PROCEDURE — 21400001 HC TELEMETRY ROOM

## 2024-07-21 PROCEDURE — 82306 VITAMIN D 25 HYDROXY: CPT | Performed by: INTERNAL MEDICINE

## 2024-07-21 RX ORDER — FAMOTIDINE 20 MG/1
20 TABLET, FILM COATED ORAL DAILY
Status: DISCONTINUED | OUTPATIENT
Start: 2024-07-22 | End: 2024-07-24 | Stop reason: HOSPADM

## 2024-07-21 RX ORDER — SIMETHICONE 80 MG
1 TABLET,CHEWABLE ORAL 3 TIMES DAILY PRN
Status: DISCONTINUED | OUTPATIENT
Start: 2024-07-21 | End: 2024-07-24 | Stop reason: HOSPADM

## 2024-07-21 RX ORDER — CALCITRIOL 0.25 UG/1
0.25 CAPSULE ORAL DAILY
Status: DISCONTINUED | OUTPATIENT
Start: 2024-07-21 | End: 2024-07-24 | Stop reason: HOSPADM

## 2024-07-21 RX ORDER — POLYETHYLENE GLYCOL 3350 17 G/17G
17 POWDER, FOR SOLUTION ORAL 2 TIMES DAILY
Status: DISCONTINUED | OUTPATIENT
Start: 2024-07-21 | End: 2024-07-22

## 2024-07-21 RX ORDER — DOCUSATE SODIUM 100 MG/1
100 CAPSULE, LIQUID FILLED ORAL 2 TIMES DAILY
Status: DISCONTINUED | OUTPATIENT
Start: 2024-07-21 | End: 2024-07-24 | Stop reason: HOSPADM

## 2024-07-21 RX ADMIN — MUPIROCIN: 20 OINTMENT TOPICAL at 09:07

## 2024-07-21 RX ADMIN — IPRATROPIUM BROMIDE AND ALBUTEROL SULFATE 3 ML: .5; 3 SOLUTION RESPIRATORY (INHALATION) at 11:07

## 2024-07-21 RX ADMIN — POLYETHYLENE GLYCOL 3350 17 G: 17 POWDER, FOR SOLUTION ORAL at 12:07

## 2024-07-21 RX ADMIN — ONDANSETRON 4 MG: 2 INJECTION INTRAMUSCULAR; INTRAVENOUS at 11:07

## 2024-07-21 RX ADMIN — LINACLOTIDE 145 MCG: 145 CAPSULE, GELATIN COATED ORAL at 12:07

## 2024-07-21 RX ADMIN — DOCUSATE SODIUM 100 MG: 100 CAPSULE, LIQUID FILLED ORAL at 12:07

## 2024-07-21 RX ADMIN — SIMETHICONE 80 MG: 80 TABLET, CHEWABLE ORAL at 04:07

## 2024-07-21 RX ADMIN — IPRATROPIUM BROMIDE AND ALBUTEROL SULFATE 3 ML: .5; 3 SOLUTION RESPIRATORY (INHALATION) at 02:07

## 2024-07-21 RX ADMIN — Medication 1 CAPSULE: at 09:07

## 2024-07-21 RX ADMIN — CALCITRIOL CAPSULES 0.25 MCG 0.25 MCG: 0.25 CAPSULE ORAL at 03:07

## 2024-07-21 RX ADMIN — ACETAMINOPHEN 650 MG: 325 TABLET, FILM COATED ORAL at 09:07

## 2024-07-21 RX ADMIN — TIOTROPIUM BROMIDE INHALATION SPRAY 2 PUFF: 3.12 SPRAY, METERED RESPIRATORY (INHALATION) at 12:07

## 2024-07-21 RX ADMIN — MUPIROCIN: 20 OINTMENT TOPICAL at 08:07

## 2024-07-21 NOTE — PROGRESS NOTES
[facility] Hospital Medicine Progress Note        Chief Complaint: Inpatient Follow-up for CKD needing HD     HPI:   Leonor Torrez is a 78 y.o. White female with a past medical history of hypertension, hyperlipidemia, atrial fibrillation not on anticoagulation, coronary artery disease status post stents and CABG not on Plavix, CKD stage 5, CVA, CLL previously undergoing immunoglobulin treatments every 2 months.  Patient recently moved to [place] from [place] and has been lost to follow-up for immunoglobulin treatments [facility] and [facility] to establish care with oncology to continued treatments.      The patient presented to [facility] on 7/19/2024 with a primary complaint of decreased urinary output.  Patient was seen at urgent care in [place] for complaints.  Provider there was concern of a bowel obstruction therefore it was recommended patient present to the ED. associated symptoms include nausea and vomiting for the last 3 weeks, decreased appetite and little to no urination for the last 4 days.  She denies complaints of fever, diarrhea, chest pain, shortness a breath, swelling to bilateral lower extremities, flank pain.  She reports talking about initiating dialysis in the past but never had an AV fistula placement.  Daughter at bedside states patient is wheelchair-bound at home.  She does have hip pain in which she needs hip surgery for but was told not to be a surgical candidate.  Patient has been getting up without the wheelchair and having several falls at home.     Upon presentation to the ED, temperature 97.3° F, heart rate 114, blood pressure 116/74, respiratory rate 20 and SpO2 97% on room air.  Labs with WBC 17.23, H&H 10/30.5, MCV 96.8, CO2 15, BUN/creatinine 171.8/8.22, glucose 125, lipase 61.  No prior labs for comparison.  EKG accelerated junction rhythm.  Chest x-ray no convincing acute abnormality. CTA of the abdomen and pelvis revealed no  evidence of bowel obstruction but a small left lower lobe pulmonary nodule with recommendations of a noncontrast CT at 6-12 months and again at 18-24 months.  In ED patient received IV fluid hydration, Tylenol, Zofran and Phenergan.  Patient I    HD cath placed and HD started per renal team.    Interval Hx:   Patient today awake and comfortable. C/O nausea and constipation. No chest pain, fever or chills.     Case was discussed with patient's nurse and  on the floor.    Objective/physical exam:  General: In no acute distress  Chest: Clear to auscultation bilaterally  Heart: RRR, +S1, S2, no appreciable murmur  Abdomen: Soft, nontender, BS +  MSK: Warm, no lower extremity edema, no clubbing or cyanosis  Neurologic: Alert and oriented x4, Cranial nerve II-XII intact, Strength 5/5 in all 4 extremities    VITAL SIGNS: 24 HRS MIN & MAX LAST   Temp  Min: 98.2 °F (36.8 °C)  Max: 98.8 °F (37.1 °C) 98.5 °F (36.9 °C)   BP  Min: 94/61  Max: 138/69 125/64   Pulse  Min: 71  Max: 94  82   Resp  Min: 18  Max: 20 18   SpO2  Min: 94 %  Max: 100 % 95 %     I have reviewed the following labs:  Recent Labs   Lab 07/19/24  1203 07/20/24  0336 07/21/24  0433   WBC 17.23  17.23* 12.86* 9.41   RBC 3.15* 2.55* 2.68*   HGB 10.0* 8.2* 8.7*   HCT 30.5* 24.6* 26.6*   MCV 96.8* 96.5* 99.3*   MCH 31.7* 32.2* 32.5*   MCHC 32.8* 33.3 32.7*   RDW 14.0 13.6 14.1    184 161   MPV 11.5* 11.6* 11.3*     Recent Labs   Lab 07/19/24  1203 07/20/24  0336 07/21/24  0433   * 135* 136   K 4.9 4.0 4.3    103 102   CO2 15* 20* 24   .8* 86.6* 34.7*   CREATININE 8.22* 4.39* 2.89*   CALCIUM 10.6* 8.7 8.3*   MG 3.50*  --   --    ALBUMIN 4.2 3.5 3.3*   ALKPHOS 73 60  --    ALT 9 8  --    AST 13 14  --    BILITOT 0.3 0.4  --      Microbiology Results (last 7 days)       Procedure Component Value Units Date/Time    Blood Culture [[id number]]  (Normal) Collected: 07/19/24 7797    Order Status: Completed Specimen: Blood Updated:  07/20/24 2000     Blood Culture No Growth At 24 Hours    Blood Culture [[id number]]  (Normal) Collected: 07/19/24 1801    Order Status: Completed Specimen: Blood Updated: 07/20/24 1901     Blood Culture No Growth At 24 Hours    Stool Culture [[id number]]     Order Status: Sent Specimen: Stool              See below for Radiology    Assessment/Plan:  CKD stage 5 needing initiation of dialysis  Left lower lobe pulmonary nodule, small  Leukocytosis ? Gastroenteritis : resolved   Macrocytic anemia, stable   Elevated lipase, mild non specific   History of leukemia- CLL    Plan:  Patient having mild nausea. Will add prn antiemetics   Added stool softeners and laxatives   Today BUN 34, Crt 2.89  Continue HD per renal team   Hb today 8.7, will monitor       Continue supportive care     Labs as needed     VTE prophylaxis: SCD    Patient condition:  Fair    Anticipated discharge and Disposition:   Home when out-patient HD has been set up       All diagnosis and differential diagnosis have been reviewed; assessment and plan has been documented; I have personally reviewed the labs and test results that are presently available; I have reviewed the patients medication list; I have reviewed the consulting providers response and recommendations. I have reviewed or attempted to review medical records based upon their availability    All of the patient's questions have been  addressed and answered. Patient's is agreeable to the above stated plan. I will continue to monitor closely and make adjustments to medical management as needed.    Portions of this note dictated using EMR integrated voice recognition software, and may be subject to voice recognition errors not corrected at proofreading. Please contact writer for clarification if needed.   _____________________________________________________________________    Malnutrition Status:    Scheduled Med:   calcitRIOL  0.25 mcg Oral Daily    epoetin shahnaz  20,000 Units Subcutaneous  Every Tues, Thurs, Sat    linaCLOtide  145 mcg Oral Once    mupirocin   Nasal BID    polyethylene glycol  17 g Oral BID    tiotropium bromide  2 puff Inhalation Daily    vitamin renal formula (B-complex-vitamin c-folic acid)  1 capsule Oral Daily      Continuous Infusions:     PRN Meds:    Current Facility-Administered Medications:     acetaminophen, 650 mg, Oral, Q8H PRN    acetaminophen, 650 mg, Oral, Q4H PRN    albuterol-ipratropium, 3 mL, Nebulization, Q4H PRN    ondansetron, 4 mg, Intravenous, Q4H PRN    sodium chloride 0.9%, 250 mL, Intravenous, PRN    sodium chloride 0.9%, 10 mL, Intravenous, Q12H PRN     Radiology:  I have personally reviewed the following imaging and agree with the radiologist.     Echo    Left Ventricle: The left ventricle is normal in size. Normal wall   thickness. There is normal systolic function with a visually estimated   ejection fraction of 55 - 60%.    Right Ventricle: Severe right ventricular enlargement. Systolic   function is normal.    Aortic Valve: The aortic valve is a trileaflet valve.    Mitral Valve: There is no stenosis. The mean pressure gradient across   the mitral valve is 3 mmHg at a heart rate of 78 bpm. There is mild   regurgitation.    Tricuspid Valve: There is mild to moderate regurgitation.    IVC/SVC: Normal venous pressure at 3 mmHg.      Jairo Waller MD  Department of Hospital Medicine   [facility]   07/21/2024

## 2024-07-21 NOTE — PROGRESS NOTES
"    Chief complaint: wheeze    Interval History:  Pt waiting on neb for some wheezing, o/w feeling much better, eating well though notes food tastes like "dirt".  She had HD #2 yesterday went well, she is feeling much stronger    Review of Systems   Constitutional: Negative.    HENT: Negative.     Respiratory:  Positive for wheezing.    Cardiovascular: Negative.    Gastrointestinal:  Positive for constipation.   Genitourinary: Negative.    Musculoskeletal: Negative.    Neurological: Negative.    Psychiatric/Behavioral: Negative.        all else Neg     epoetin shahnaz  20,000 Units Subcutaneous Every Tues, Thurs, Sat    mupirocin   Nasal BID    polyethylene glycol  17 g Oral BID    vitamin renal formula (B-complex-vitamin c-folic acid)  1 capsule Oral Daily       Objective     VITAL SIGNS: 24 HR MIN & MAX LAST    Temp  Min: 98.2 °F (36.8 °C)  Max: 98.8 °F (37.1 °C)  98.3 °F (36.8 °C)    BP  Min: 94/61  Max: 138/69  122/72     Pulse  Min: 71  Max: 94  83     Resp  Min: 18  Max: 20  18    SpO2  Min: 94 %  Max: 100 %  99 %      Wt Readings from Last 3 Encounters:   07/20/24 93.3 kg (205 lb 9.6 oz)       Intake/Output Summary (Last 24 hours) at 7/21/2024 0959  Last data filed at 7/20/2024 2224  Gross per 24 hour   Intake 720 ml   Output 320 ml   Net 400 ml       Physical Exam  Constitutional:       General: She is not in acute distress.  Cardiovascular:      Rate and Rhythm: Normal rate.   Pulmonary:      Effort: Pulmonary effort is normal. No respiratory distress.      Breath sounds: Wheezing present.   Abdominal:      General: Bowel sounds are normal. There is no distension.      Palpations: Abdomen is soft.      Tenderness: There is no abdominal tenderness.   Musculoskeletal:         General: No swelling.      Cervical back: Normal range of motion.   Neurological:      Mental Status: She is alert and oriented to person, place, and time. Mental status is at baseline.   Psychiatric:         Mood and Affect: Mood normal. "          Recent Labs     07/19/24  1203 07/20/24  0336 07/21/24  0433   * 135* 136   K 4.9 4.0 4.3   CO2 15* 20* 24   .8* 86.6* 34.7*   CREATININE 8.22* 4.39* 2.89*   GLUCOSE 125* 94 102   CALCIUM 10.6* 8.7 8.3*   MG 3.50*  --   --    PHOS 7.9*  --  3.6   ALBUMIN 4.2 3.5 3.3*      Recent Labs     07/19/24  1203 07/20/24  0336 07/21/24  0433   WBC 17.23  17.23* 12.86* 9.41   HGB 10.0* 8.2* 8.7*   HCT 30.5* 24.6* 26.6*    184 161         Assessment & Plan     ESRD - Urgent HD initiated 7/19 for symptomatic uremia, she had HD#2 yesterday, feeling much better, tolerating po diet. CKD due to NSAID's vs IVIG toxicity per pt's nephrologist in [place] (no anatomical abnormalities on CT).  For HD #3, tunneled HD cath placement tomorrow.  Case mngt consulted for outpt HD unit placement as well.  She appears to be poor candidate for PD considering prior extensive abdominal surgeries, immobility  Anemia - Hb improved 8.7, on EPO 20 K U TIW for now awaiting oncology guidance for EPO use in CLL.  Fe stores ok  CLL - pt waiting to establist with local oncologist to continue therapy  MBD - phos normalized 3.6 on HD, , will add calcitriol  Mild malnutrition - supplement shakes ordered, consider more liberal diet  HTN, CAD - normotensive, lipids look ok, CAD mngt as per primary, she will need to establish with local cardiologist  Anx/Dep - consider resuming home meds, as per primary  Spinal stenosis, OA/DJD, immobility - PT consulted, ok for NSAID's as needed now that she is on HD  Constipation - miralax, for linzess x 1 dose  COPD - nebs prn, add Spirivia (home med)

## 2024-07-22 PROBLEM — N17.9 ACUTE RENAL FAILURE: Status: ACTIVE | Noted: 2024-07-22

## 2024-07-22 PROCEDURE — 80100016 HC MAINTENANCE HEMODIALYSIS

## 2024-07-22 PROCEDURE — 36581 REPLACE TUNNELED CV CATH: CPT | Mod: RT,,, | Performed by: STUDENT IN AN ORGANIZED HEALTH CARE EDUCATION/TRAINING PROGRAM

## 2024-07-22 PROCEDURE — 63600175 PHARM REV CODE 636 W HCPCS: Performed by: STUDENT IN AN ORGANIZED HEALTH CARE EDUCATION/TRAINING PROGRAM

## 2024-07-22 PROCEDURE — 25000003 PHARM REV CODE 250: Performed by: NURSE PRACTITIONER

## 2024-07-22 PROCEDURE — 77001 FLUOROGUIDE FOR VEIN DEVICE: CPT | Mod: 26,,, | Performed by: STUDENT IN AN ORGANIZED HEALTH CARE EDUCATION/TRAINING PROGRAM

## 2024-07-22 PROCEDURE — C1750 CATH, HEMODIALYSIS,LONG-TERM: HCPCS | Performed by: STUDENT IN AN ORGANIZED HEALTH CARE EDUCATION/TRAINING PROGRAM

## 2024-07-22 PROCEDURE — 99152 MOD SED SAME PHYS/QHP 5/>YRS: CPT | Performed by: STUDENT IN AN ORGANIZED HEALTH CARE EDUCATION/TRAINING PROGRAM

## 2024-07-22 PROCEDURE — 99222 1ST HOSP IP/OBS MODERATE 55: CPT | Mod: 25,,, | Performed by: STUDENT IN AN ORGANIZED HEALTH CARE EDUCATION/TRAINING PROGRAM

## 2024-07-22 PROCEDURE — 02HV33Z INSERTION OF INFUSION DEVICE INTO SUPERIOR VENA CAVA, PERCUTANEOUS APPROACH: ICD-10-PCS | Performed by: STUDENT IN AN ORGANIZED HEALTH CARE EDUCATION/TRAINING PROGRAM

## 2024-07-22 PROCEDURE — 99152 MOD SED SAME PHYS/QHP 5/>YRS: CPT | Mod: ,,, | Performed by: STUDENT IN AN ORGANIZED HEALTH CARE EDUCATION/TRAINING PROGRAM

## 2024-07-22 PROCEDURE — 21400001 HC TELEMETRY ROOM

## 2024-07-22 PROCEDURE — 25000003 PHARM REV CODE 250: Performed by: INTERNAL MEDICINE

## 2024-07-22 PROCEDURE — 25000003 PHARM REV CODE 250: Performed by: STUDENT IN AN ORGANIZED HEALTH CARE EDUCATION/TRAINING PROGRAM

## 2024-07-22 PROCEDURE — 0JH63XZ INSERTION OF TUNNELED VASCULAR ACCESS DEVICE INTO CHEST SUBCUTANEOUS TISSUE AND FASCIA, PERCUTANEOUS APPROACH: ICD-10-PCS | Performed by: STUDENT IN AN ORGANIZED HEALTH CARE EDUCATION/TRAINING PROGRAM

## 2024-07-22 PROCEDURE — 25000003 PHARM REV CODE 250: Performed by: PHYSICIAN ASSISTANT

## 2024-07-22 PROCEDURE — 02PY33Z REMOVAL OF INFUSION DEVICE FROM GREAT VESSEL, PERCUTANEOUS APPROACH: ICD-10-PCS | Performed by: STUDENT IN AN ORGANIZED HEALTH CARE EDUCATION/TRAINING PROGRAM

## 2024-07-22 PROCEDURE — 36581 REPLACE TUNNELED CV CATH: CPT | Mod: RT | Performed by: STUDENT IN AN ORGANIZED HEALTH CARE EDUCATION/TRAINING PROGRAM

## 2024-07-22 PROCEDURE — 99153 MOD SED SAME PHYS/QHP EA: CPT | Performed by: STUDENT IN AN ORGANIZED HEALTH CARE EDUCATION/TRAINING PROGRAM

## 2024-07-22 PROCEDURE — 77001 FLUOROGUIDE FOR VEIN DEVICE: CPT | Performed by: STUDENT IN AN ORGANIZED HEALTH CARE EDUCATION/TRAINING PROGRAM

## 2024-07-22 DEVICE — CATH GLIDEPATH 14.5F 19CM 24CM: Type: IMPLANTABLE DEVICE | Site: CHEST  WALL | Status: FUNCTIONAL

## 2024-07-22 RX ORDER — BUPROPION HYDROCHLORIDE 150 MG/1
150 TABLET, EXTENDED RELEASE ORAL DAILY
Status: DISCONTINUED | OUTPATIENT
Start: 2024-07-22 | End: 2024-07-24 | Stop reason: HOSPADM

## 2024-07-22 RX ORDER — MIDAZOLAM HYDROCHLORIDE 1 MG/ML
INJECTION INTRAMUSCULAR; INTRAVENOUS
Status: DISCONTINUED | OUTPATIENT
Start: 2024-07-22 | End: 2024-07-22 | Stop reason: HOSPADM

## 2024-07-22 RX ORDER — ALPRAZOLAM 0.25 MG/1
0.25 TABLET ORAL ONCE
Status: COMPLETED | OUTPATIENT
Start: 2024-07-22 | End: 2024-07-22

## 2024-07-22 RX ORDER — FENTANYL CITRATE 50 UG/ML
INJECTION, SOLUTION INTRAMUSCULAR; INTRAVENOUS
Status: DISCONTINUED | OUTPATIENT
Start: 2024-07-22 | End: 2024-07-22 | Stop reason: HOSPADM

## 2024-07-22 RX ORDER — CITALOPRAM 20 MG/1
20 TABLET, FILM COATED ORAL DAILY
Status: DISCONTINUED | OUTPATIENT
Start: 2024-07-22 | End: 2024-07-22

## 2024-07-22 RX ORDER — FOLIC ACID 1 MG/1
1 TABLET ORAL DAILY
Status: DISCONTINUED | OUTPATIENT
Start: 2024-07-22 | End: 2024-07-24 | Stop reason: HOSPADM

## 2024-07-22 RX ORDER — LIDOCAINE HYDROCHLORIDE 10 MG/ML
INJECTION INFILTRATION; PERINEURAL
Status: DISCONTINUED | OUTPATIENT
Start: 2024-07-22 | End: 2024-07-22 | Stop reason: HOSPADM

## 2024-07-22 RX ORDER — FLUTICASONE PROPIONATE AND SALMETEROL 500; 50 UG/1; UG/1
1 POWDER RESPIRATORY (INHALATION) EVERY 12 HOURS
Status: DISCONTINUED | OUTPATIENT
Start: 2024-07-22 | End: 2024-07-24 | Stop reason: HOSPADM

## 2024-07-22 RX ORDER — CITALOPRAM 20 MG/1
20 TABLET, FILM COATED ORAL DAILY
Status: DISCONTINUED | OUTPATIENT
Start: 2024-07-22 | End: 2024-07-23

## 2024-07-22 RX ORDER — ALLOPURINOL 300 MG/1
300 TABLET ORAL DAILY
Status: DISCONTINUED | OUTPATIENT
Start: 2024-07-22 | End: 2024-07-23

## 2024-07-22 RX ORDER — ASPIRIN 325 MG
325 TABLET, DELAYED RELEASE (ENTERIC COATED) ORAL DAILY
Status: DISCONTINUED | OUTPATIENT
Start: 2024-07-22 | End: 2024-07-24 | Stop reason: HOSPADM

## 2024-07-22 RX ADMIN — ALPRAZOLAM 0.25 MG: 0.25 TABLET ORAL at 12:07

## 2024-07-22 RX ADMIN — Medication 1 CAPSULE: at 01:07

## 2024-07-22 RX ADMIN — FAMOTIDINE 20 MG: 20 TABLET, FILM COATED ORAL at 01:07

## 2024-07-22 RX ADMIN — DOCUSATE SODIUM 100 MG: 100 CAPSULE, LIQUID FILLED ORAL at 09:07

## 2024-07-22 RX ADMIN — CALCITRIOL CAPSULES 0.25 MCG 0.25 MCG: 0.25 CAPSULE ORAL at 01:07

## 2024-07-22 RX ADMIN — BUPROPION HYDROCHLORIDE 150 MG: 150 TABLET, EXTENDED RELEASE ORAL at 01:07

## 2024-07-22 RX ADMIN — CITALOPRAM HYDROBROMIDE 20 MG: 20 TABLET ORAL at 09:07

## 2024-07-22 RX ADMIN — MUPIROCIN: 20 OINTMENT TOPICAL at 01:07

## 2024-07-22 RX ADMIN — FOLIC ACID 1 MG: 1 TABLET ORAL at 01:07

## 2024-07-22 RX ADMIN — MUPIROCIN: 20 OINTMENT TOPICAL at 09:07

## 2024-07-22 RX ADMIN — ACETAMINOPHEN 650 MG: 325 TABLET, FILM COATED ORAL at 05:07

## 2024-07-22 RX ADMIN — ASPIRIN 325 MG: 325 TABLET, COATED ORAL at 09:07

## 2024-07-22 RX ADMIN — ALLOPURINOL 300 MG: 300 TABLET ORAL at 01:07

## 2024-07-22 NOTE — CONSULTS
INTERVENTIONAL NEPHROLOGY INITIAL CONSULTATION NOTE       Patient Name: Leonor Torrez  ARIELA 1945    Patient Seen Date: 2024  Patient Seen Time: 8:16 AM     Consult requested by: Jairo Waller MD     Reason for consult: Need for tunneled dialysis catheter insertion       HPI: 78-year-old female with advanced CKD, CLL, HTN, HLD, Afib, CAD s/p CABG who presented to the ER with complaints of decreasing UOP. Pt was found to have severely elevated BUN and had immediate placement of a RIJ non-tunneled HD catheter and initiated dialysis on 24. Nephrology service has determined the pt will require continued HD treatments, hence interventional nephrology service was consulted for tunneled catheter insertion. Of note, this consult was deferred from vascular surgery on-call to our service on request.    Pt seen and examined at bedside in the HD unit this AM. Pt is being prepared for HD treatment this AM. Pt is NPO. She denies complaints, but states that her appetite has not returned to normal as of yet. Risks and benefits of tunneled dialysis catheter insertion and intravenous conscious sedation was reviewed with the patient. The patient agrees to proceed with the intended procedure. Consents for both intravenous conscious sedation and procedure were signed and placed within the chart.         Review of Systems:  General:  No fatigue  Skin: No rashes  HEENT: No vision changes  CVS: No CP  RS: No SOB  GIT: No abdominal pain  Extremities: No swelling  Neurological:  No focal weakness  Psych: No depression    Past Medical History:   Diagnosis Date    CKD (chronic kidney disease)     Leukemia       No past surgical history on file.   Review of patient's allergies indicates:   Allergen Reactions    Codeine Hives    Opioids - morphine analogues Hives          No family history on file.      Current Facility-Administered Medications:     acetaminophen tablet 650 mg, 650 mg, Oral, Q8H PRN, Ke  Nataly LOPES PA-C    acetaminophen tablet 650 mg, 650 mg, Oral, Q4H PRN, Nataly Dempsey PA-C, 650 mg at 07/21/24 0947    albuterol-ipratropium 2.5 mg-0.5 mg/3 mL nebulizer solution 3 mL, 3 mL, Nebulization, Q4H PRN, Tom Fitch MD, 3 mL at 07/21/24 1133    calcitRIOL capsule 0.25 mcg, 0.25 mcg, Oral, Daily, Tom Fitch MD, 0.25 mcg at 07/21/24 1512    docusate sodium capsule 100 mg, 100 mg, Oral, BID, Jairo Waller MD, 100 mg at 07/21/24 1201    epoetin shahnaz injection 20,000 Units, 20,000 Units, Subcutaneous, Every Tues, Thurs, Sat, Tom Fitch MD, 20,000 Units at 07/20/24 1245    famotidine tablet 20 mg, 20 mg, Oral, Daily, Jairo Waller MD    mupirocin 2 % ointment, , Nasal, BID, Jairo Waller MD, Given at 07/21/24 2005    ondansetron injection 4 mg, 4 mg, Intravenous, Q4H PRN, Nataly Dempsey PA-C, 4 mg at 07/21/24 1123    polyethylene glycol packet 17 g, 17 g, Oral, BID, Tom Fitch MD, 17 g at 07/21/24 1200    simethicone chewable tablet 80 mg, 1 tablet, Oral, TID PRN, Jairo Waller MD, 80 mg at 07/21/24 1613    sodium chloride 0.9% bolus 250 mL 250 mL, 250 mL, Intravenous, PRN, Elizabeth Mai MD    sodium chloride 0.9% flush 10 mL, 10 mL, Intravenous, Q12H PRN, Nataly Dempsey PA-C    tiotropium bromide 2.5 mcg/actuation inhaler 2 puff, 2 puff, Inhalation, Daily, Tom Fitch MD, 2 puff at 07/21/24 1201    vitamin renal formula (B-complex-vitamin c-folic acid) 1 mg per capsule 1 capsule, 1 capsule, Oral, Daily, Tom Fitch MD, 1 capsule at 07/21/24 0947    Vital Signs (24 h):  Temp:  [98 °F (36.7 °C)-98.5 °F (36.9 °C)] 98.4 °F (36.9 °C)  Pulse:  [] 77  Resp:  [18] 18  SpO2:  [95 %-100 %] 100 %  BP: ()/(56-80) 114/57   I/O last 3 completed shifts:  In: 462 [P.O.:462]  Out: 3 [Stool:3]  No intake/output data recorded.        Physical Exam:  General: NAD, elderly  HEENT:  NC/AT, EOMI  CVS: S1/S2 present and normal. No murmur/rubs/gallop.      RS: Lung CTAB, No rales/rhonchi/wheeze.     Abdominal: + obese  Extremities: trace edema b/l LE  Skin: No rash, no lesions.  Neurological: No focal deficits.  Psych: Normal affect  Dialysis Access: right internal jugular (RIJ) temporary, non-tunneled HD catheter being access for HD    Results:    Lab Results   Component Value Date     07/21/2024    K 4.3 07/21/2024     07/21/2024    CO2 24 07/21/2024    BUN 34.7 (H) 07/21/2024    CREATININE 2.89 (H) 07/21/2024     Lab Results   Component Value Date    WBC 9.41 07/21/2024    WBC 17.23 07/19/2024    HGB 8.7 (L) 07/21/2024     07/21/2024    MCV 99.3 (H) 07/21/2024       Assessment and Plan:      Advanced CKD/ESRD requiring long-term HD initiation.  Need for tunneled dialysis catheter insertion.  Pt with advanced CKD/ESRD who was initiated on long-term HD by nephrology team during this hospitalization. Pt will need tunneled dialysis catheter before discharge, hence interventional nephrology service was consulted.  - Pt should remain NPO. Plan on placement of tunneled dialysis catheter today in cath lab setting.  - Consents obtained and placed in chart.    Thank you for your consult. Please feel free to reach me with any questions.    Tyson Barajas,   Interventional Nephrology  Cell: [phone]

## 2024-07-22 NOTE — PT/OT/SLP PROGRESS
Physical Therapy      Patient Name:  Leonor Torrez   MRN:  [id number]    Patient receiving dialysis. Will follow up if schedule permits.

## 2024-07-22 NOTE — PROGRESS NOTES
[facility] Hospital Medicine Progress Note        Chief Complaint: Inpatient Follow-up for CKD needing HD     HPI:   Leonor Torrez is a 78 y.o. White female with a past medical history of hypertension, hyperlipidemia, atrial fibrillation not on anticoagulation, coronary artery disease status post stents and CABG not on Plavix, CKD stage 5, CVA, CLL previously undergoing immunoglobulin treatments every 2 months.  Patient recently moved to [place] from [place] and has been lost to follow-up for immunoglobulin treatments [facility] and awaiting to establish care with oncology to continued treatments.      The patient presented to [facility] on 7/19/2024 with a primary complaint of decreased urinary output.  Patient was seen at urgent care in [place] for complaints.  Provider there was concern of a bowel obstruction therefore it was recommended patient present to the ED. associated symptoms include nausea and vomiting for the last 3 weeks, decreased appetite and little to no urination for the last 4 days.  She denies complaints of fever, diarrhea, chest pain, shortness a breath, swelling to bilateral lower extremities, flank pain.  She reports talking about initiating dialysis in the past but never had an AV fistula placement.  Daughter at bedside states patient is wheelchair-bound at home.  She does have hip pain in which she needs hip surgery for but was told not to be a surgical candidate.  Patient has been getting up without the wheelchair and having several falls at home.     Upon presentation to the ED, temperature 97.3° F, heart rate 114, blood pressure 116/74, respiratory rate 20 and SpO2 97% on room air.  Labs with WBC 17.23, H&H 10/30.5, MCV 96.8, CO2 15, BUN/creatinine 171.8/8.22, glucose 125, lipase 61.  No prior labs for comparison.  EKG accelerated junction rhythm.  Chest x-ray no convincing acute abnormality. CTA of the abdomen and pelvis revealed no  evidence of bowel obstruction but a small left lower lobe pulmonary nodule with recommendations of a noncontrast CT at 6-12 months and again at 18-24 months.  In ED patient received IV fluid hydration, Tylenol, Zofran and Phenergan.  Patient I    HD cath placed and HD started per renal team. Patient was stable and doing well. Will be discharged home when out patient HD has been set up.     Interval Hx:   Patient today awake and comfortable. Denies any SOB, chest pain, fever or chills.     Case was discussed with patient's nurse and  on the floor.    Objective/physical exam:  General: In no acute distress  Chest: Clear to auscultation bilaterally  Heart: RRR, +S1, S2, no appreciable murmur  Abdomen: Soft, nontender, BS +  MSK: Warm, no lower extremity edema, no clubbing or cyanosis  Neurologic: Alert and oriented x4, Cranial nerve II-XII intact, Strength 5/5 in all 4 extremities    VITAL SIGNS: 24 HRS MIN & MAX LAST   Temp  Min: 98 °F (36.7 °C)  Max: 98.4 °F (36.9 °C) 98.4 °F (36.9 °C)   BP  Min: 98/58  Max: 138/80 (!) 114/57   Pulse  Min: 77  Max: 104  77   Resp  Min: 18  Max: 18 18   SpO2  Min: 95 %  Max: 100 % 100 %     I have reviewed the following labs:  Recent Labs   Lab 07/19/24  1203 07/20/24  0336 07/21/24  0433   WBC 17.23  17.23* 12.86* 9.41   RBC 3.15* 2.55* 2.68*   HGB 10.0* 8.2* 8.7*   HCT 30.5* 24.6* 26.6*   MCV 96.8* 96.5* 99.3*   MCH 31.7* 32.2* 32.5*   MCHC 32.8* 33.3 32.7*   RDW 14.0 13.6 14.1    184 161   MPV 11.5* 11.6* 11.3*     Recent Labs   Lab 07/19/24  1203 07/20/24  0336 07/21/24  0433   * 135* 136   K 4.9 4.0 4.3    103 102   CO2 15* 20* 24   .8* 86.6* 34.7*   CREATININE 8.22* 4.39* 2.89*   CALCIUM 10.6* 8.7 8.3*   MG 3.50*  --   --    ALBUMIN 4.2 3.5 3.3*   ALKPHOS 73 60  --    ALT 9 8  --    AST 13 14  --    BILITOT 0.3 0.4  --      Microbiology Results (last 7 days)       Procedure Component Value Units Date/Time    Stool Culture [[id number]]   (Normal) Collected: 07/21/24 1605    Order Status: Completed Specimen: Stool Updated: 07/22/24 0729     Stool Culture Negative for Salmonella, Shigella, Campylobacter, Vibrio, Aeromonas, Pleisiomonas,Yersinia, or Shiga Toxin 1 and 2.    Blood Culture [[id number]]  (Normal) Collected: 07/19/24 1801    Order Status: Completed Specimen: Blood Updated: 07/21/24 2000     Blood Culture No Growth At 48 Hours    Blood Culture [[id number]]  (Normal) Collected: 07/19/24 1801    Order Status: Completed Specimen: Blood Updated: 07/21/24 1901     Blood Culture No Growth At 48 Hours             See below for Radiology    Assessment/Plan:  CKD stage 5 needing initiation of dialysis  Left lower lobe pulmonary nodule, small  Leukocytosis ? Gastroenteritis : resolved   Macrocytic anemia, stable   Elevated lipase, mild non specific   History of leukemia- CLL    Plan:  Patient has no new issues. Tolerating HD   Has been Afebrile. Lungs clear   Home meds restarted   Continue stool softeners and laxatives       Continue supportive care     Labs as needed     VTE prophylaxis: SCD    Patient condition:  Fair    Anticipated discharge and Disposition:   Home when out-patient HD has been set up       All diagnosis and differential diagnosis have been reviewed; assessment and plan has been documented; I have personally reviewed the labs and test results that are presently available; I have reviewed the patients medication list; I have reviewed the consulting providers response and recommendations. I have reviewed or attempted to review medical records based upon their availability    All of the patient's questions have been  addressed and answered. Patient's is agreeable to the above stated plan. I will continue to monitor closely and make adjustments to medical management as needed.    Portions of this note dictated using EMR integrated voice recognition software, and may be subject to voice recognition errors not corrected at proofreading.  Please contact writer for clarification if needed.   _____________________________________________________________________    Malnutrition Status:    Scheduled Med:   calcitRIOL  0.25 mcg Oral Daily    docusate sodium  100 mg Oral BID    epoetin shahnaz  20,000 Units Subcutaneous Every Tues, Thurs, Sat    famotidine  20 mg Oral Daily    mupirocin   Nasal BID    tiotropium bromide  2 puff Inhalation Daily    vitamin renal formula (B-complex-vitamin c-folic acid)  1 capsule Oral Daily      Continuous Infusions:     PRN Meds:    Current Facility-Administered Medications:     acetaminophen, 650 mg, Oral, Q8H PRN    acetaminophen, 650 mg, Oral, Q4H PRN    albuterol-ipratropium, 3 mL, Nebulization, Q4H PRN    ondansetron, 4 mg, Intravenous, Q4H PRN    simethicone, 1 tablet, Oral, TID PRN    sodium chloride 0.9%, 250 mL, Intravenous, PRN    sodium chloride 0.9%, 10 mL, Intravenous, Q12H PRN     Radiology:  I have personally reviewed the following imaging and agree with the radiologist.     Echo    Left Ventricle: The left ventricle is normal in size. Normal wall   thickness. There is normal systolic function with a visually estimated   ejection fraction of 55 - 60%.    Right Ventricle: Severe right ventricular enlargement. Systolic   function is normal.    Aortic Valve: The aortic valve is a trileaflet valve.    Mitral Valve: There is no stenosis. The mean pressure gradient across   the mitral valve is 3 mmHg at a heart rate of 78 bpm. There is mild   regurgitation.    Tricuspid Valve: There is mild to moderate regurgitation.    IVC/SVC: Normal venous pressure at 3 mmHg.      Jairo Waller MD  Department of Hospital Medicine   [facility]   07/22/2024

## 2024-07-22 NOTE — PROGRESS NOTES
Chief complaint: depression    Interval History:  Pt seen on HD, she is NPO for tunneled HD cath later today.  Today she has no c/o x depression, requesting her home meds be resumed.  Also notes sinus congestion    Review of Systems   Constitutional: Negative.    HENT:  Positive for sinus pressure.    Respiratory: Negative.     Cardiovascular: Negative.    Gastrointestinal: Negative.    Genitourinary: Negative.    Musculoskeletal:  Positive for back pain (chronic).   Neurological: Negative.    Psychiatric/Behavioral:  Positive for dysphoric mood.       all else Neg     calcitRIOL  0.25 mcg Oral Daily    docusate sodium  100 mg Oral BID    epoetin shahnaz  20,000 Units Subcutaneous Every Tues, Thurs, Sat    famotidine  20 mg Oral Daily    mupirocin   Nasal BID    polyethylene glycol  17 g Oral BID    tiotropium bromide  2 puff Inhalation Daily    vitamin renal formula (B-complex-vitamin c-folic acid)  1 capsule Oral Daily       Objective     VITAL SIGNS: 24 HR MIN & MAX LAST    Temp  Min: 98 °F (36.7 °C)  Max: 98.5 °F (36.9 °C)  98.4 °F (36.9 °C)    BP  Min: 98/58  Max: 138/80  (!) 114/57     Pulse  Min: 77  Max: 104  77     Resp  Min: 18  Max: 18  18    SpO2  Min: 95 %  Max: 100 %  100 %      Wt Readings from Last 3 Encounters:   07/22/24 95.2 kg (209 lb 12.8 oz)       Intake/Output Summary (Last 24 hours) at 7/22/2024 1035  Last data filed at 7/21/2024 2230  Gross per 24 hour   Intake 222 ml   Output 3 ml   Net 219 ml       Physical Exam  Constitutional:       General: She is not in acute distress.  Cardiovascular:      Rate and Rhythm: Normal rate.   Pulmonary:      Effort: Pulmonary effort is normal. No respiratory distress.   Abdominal:      General: Bowel sounds are normal.      Palpations: Abdomen is soft.      Tenderness: There is no abdominal tenderness.   Musculoskeletal:      Cervical back: Normal range of motion.   Neurological:      Mental Status: She is alert and oriented to person, place, and time.    Psychiatric:         Mood and Affect: Mood normal.          Recent Labs     07/19/24  1203 07/20/24  0336 07/21/24  0433   * 135* 136   K 4.9 4.0 4.3   CO2 15* 20* 24   .8* 86.6* 34.7*   CREATININE 8.22* 4.39* 2.89*   GLUCOSE 125* 94 102   CALCIUM 10.6* 8.7 8.3*   MG 3.50*  --   --    PHOS 7.9*  --  3.6   ALBUMIN 4.2 3.5 3.3*      Recent Labs     07/19/24  1203 07/20/24  0336 07/21/24  0433   WBC 17.23  17.23* 12.86* 9.41   HGB 10.0* 8.2* 8.7*   HCT 30.5* 24.6* 26.6*    184 161         Assessment & Plan     ESRD - Urgent HD initiated 7/19 for symptomatic uremia, she had HD#2 Saturday, feeling much better, tolerating po diet. CKD due to NSAID's vs IVIG toxicity per pt's nephrologist in [place] (no anatomical abnormalities on CT).  She is having HD #3 today, tunneled HD cath placement later today, she is NPO.  Case mngt consulted for outpt HD unit placement as well.  She appears to be poor candidate for PD considering she lives alone, mostly wheel-chair bound  Anemia - Hb improved 8.7, on EPO 20 K U TIW for now awaiting oncology guidance for EPO use in CLL.  Fe stores ok  CLL - pt waiting to establist with local oncologist to continue therapy  MBD - phos normalized 3.6 on HD, , calcitriol added  Mild malnutrition - supplement shakes ordered, consider more liberal diet  HTN, CAD - normotensive, lipids look ok, CAD mngt as per primary, she will need to establish with local cardiologist  Anx/Dep - consider resuming home meds, as per primary  Spinal stenosis, OA/DJD, immobility - PT consulted, ok for NSAID's as needed now that she is on HD  Constipation - resolved p miralax, linzess  COPD - nebs prn, Spiriva added (home med)

## 2024-07-22 NOTE — NURSING
07/22/24 1100   Post-Hemodialysis Assessment   Rinseback Volume (mL) 500 mL   Blood Volume Processed (Liters) 54 L   Dialyzer Clearance Clear   Duration of Treatment 180 minutes   Additional Fluid Intake (mL) 0 mL   Total UF (mL) 1500 mL   Net Fluid Removal 1000   Patient Response to Treatment pt tolerated ok. Had a couple of hypotensive episodes that required a decrease in UFG. Dr Wesley aware. No new orders. New caps applied.

## 2024-07-22 NOTE — PLAN OF CARE
Problem: Adult Inpatient Plan of Care  Goal: Plan of Care Review  Outcome: Progressing  Flowsheets (Taken 7/21/2024 1906)  Plan of Care Reviewed With: patient  Goal: Patient-Specific Goal (Individualized)  Outcome: Progressing  Goal: Absence of Hospital-Acquired Illness or Injury  Outcome: Progressing  Intervention: Identify and Manage Fall Risk  Flowsheets (Taken 7/21/2024 1906)  Safety Promotion/Fall Prevention:   assistive device/personal item within reach   side rails raised x 2   nonskid shoes/socks when out of bed  Intervention: Prevent Skin Injury  Flowsheets (Taken 7/21/2024 1906)  Body Position: position changed independently  Skin Protection: protective footwear used  Device Skin Pressure Protection: tubing/devices free from skin contact  Intervention: Prevent and Manage VTE (Venous Thromboembolism) Risk  Flowsheets (Taken 7/21/2024 1906)  VTE Prevention/Management: remove, assess skin, and reapply sequential compression device  Intervention: Prevent Infection  Flowsheets (Taken 7/21/2024 1906)  Infection Prevention:   rest/sleep promoted   single patient room provided  Goal: Optimal Comfort and Wellbeing  Outcome: Progressing  Intervention: Monitor Pain and Promote Comfort  Flowsheets (Taken 7/21/2024 1906)  Pain Management Interventions:   care clustered   quiet environment facilitated  Intervention: Provide Person-Centered Care  Flowsheets (Taken 7/21/2024 1906)  Trust Relationship/Rapport:   questions answered   questions encouraged  Goal: Readiness for Transition of Care  Outcome: Progressing     Problem: Infection  Goal: Absence of Infection Signs and Symptoms  Outcome: Progressing     Problem: Skin Injury Risk Increased  Goal: Skin Health and Integrity  Outcome: Progressing  Intervention: Optimize Skin Protection  Flowsheets (Taken 7/21/2024 1906)  Skin Protection: protective footwear used

## 2024-07-22 NOTE — NURSING
Nurses Note -- 4 Eyes      7/21/2024   7:37 PM      Skin assessed during: Daily Assessment      [x] No Altered Skin Integrity Present    []Prevention Measures Documented      [] Yes- Altered Skin Integrity Present or Discovered   [] LDA Added if Not in Epic (Describe Wound)   [] New Altered Skin Integrity was Present on Admit and Documented in LDA   [] Wound Image Taken    Wound Care Consulted? No    Attending Nurse: Jennifer Ba RN    Second RN/Staff Member:   Monica Coello RN

## 2024-07-22 NOTE — PT/OT/SLP PROGRESS
Occupational Therapy      Patient Name:  Leonor Torrez   MRN:  [id number]    OT eval orders received. Pt off floor in dialysis. OT to follow up as schedule permits.     7/22/2024

## 2024-07-22 NOTE — PROCEDURES
INTERVENTIONAL NEPHROLOGY PROCEDURE NOTE:   TUNNELED DIALYSIS CATHETER (TDC) EXCHANGE       Patient Name: Leonor BRANCH.O.B. 1945    Procedure Date:    2024      Performing Physician:   Dr. Barajas    Access History: Pt is with ESRD requiring HD now with nonfunctional/dysfunctional tunneled dialysis catheter.    Pre-Op Diagnosis: T82.4 Mechanical complication of vascular dialysis catheter, initial encounter, N18.6 End Stage Renal Disease (ESRD).  Post-Op Diagnosis: Same    Procedure: Tunneled dialysis catheter exchange.    Indication: Nonfunctional/dysfunctional dialysis catheter.    Anatomical Site: right internal jugular (RIJ)/right chest wall (RCW)    Informed Consent:  The patient was evaluated in the pre-operative area with assessment including the American Society of Anesthesia risk classification. The procedure is discussed in detail including risks, benefits alternatives and options and the patient agrees to proceed. Informed consent was obtained from the patient.     Maximum sterile barrier technique: The patient was prepped and draped using chlorhexidine prep and maximum sterile barrier technique.    Sedation Note:  Risks and benefits of sedation were reviewed with the patient or surrogate, including bleeding, infection, nausea, vomiting, dizziness, instability, damage to a nerve, damage to a blood vessel, cellulitis, reaction to medications, amnesia, loss of consciousness, respiratory arrest, cardiac arrest.     The patient received the following medications: Versed 2 mg IV and Fentanyl 75 mcg IV; patient did remain alert, responsive, and conversational throughout the procedure. I was personally responsible for supervising the administration of moderate sedation services during the procedure performed and I affirm all the guidelines and requirements described in the CPT 2024 section on moderate sedation were followed, including the use of an independent trained observer who had no other  duties during the procedure. The total face-to-face time was 30 minutes.    Procedure Steps:   The patient was prepped and draped in sterile fashion. A 150 cm guide wire was passed with tip position verified by fluoroscopy. Local anesthesia was administered by injecting 1% lidocaine throughout the current tunnel and exit site. The exit site cuff was freed with blunt dissection until the catheter could be easily moved. At this time the old catheter was partially removed over the guide wire. Angiogram was completed via old catheter which showed no fibrin sheath. The old catheter was then completely removed. The guide wire was carefully cleansed with betadine  x 3 then saline  x 3, then the  re-gloved.      The 19 cm (cuff-to-tip) tunneled dialysis catheter was placed on the guide wire and passed through the existing tunnel. The cuff was placed approximately 1.5 centimeters inside the tunnel. The placement of the catheter tip (at the junction of the SVC and right atrium) and the top of the permacath was confirmed with fluoroscopy.     Catheter appeared to function well with various tests utilizing a 10 cc syringe. The catheter limbs were then flushed with saline, followed by instillation of appropriate amounts of heparin as marked by the catheter hub. Tunnel exit site was closed with monoderm suture, being careful to not lynn the catheter. Catheter hub was fixed to the chest wall using silk suture.      ASSESSMENT/PLAN:  - Successful exchange of tunneled dialysis catheter: right internal jugular (RIJ)/right chest wall (RCW) 19 cm (cuff-to-tip) TDC (BD Glide[facility]).    EBL: 5 cc    Complications: None    Orders to the dialysis unit: OK to use tunneled dialysis catheter for dialysis.    Thank you for allowing me the opportunity in taking care of this patient. Please reach me with any questions.    Tyson Barajas DO  Interventional Nephrology  Cell: [phone]

## 2024-07-22 NOTE — PLAN OF CARE
Called and spoke to patient's daughter Kassie.  She stated that the [place] is from out of [place] and does not have a nephrologist here.  She stated that the patient would like to have Dr. Fitch as her nephrologist and have outpatient dialysis at [facility] dialysis clinic in [place].  Called [facility] [phone] and spoke to Maribel to set up outpatient dialysis. She instructed me to fax clinicals to fax [phone]. Clinicals faxed.

## 2024-07-23 LAB
ALBUMIN SERPL-MCNC: 3 G/DL (ref 3.4–4.8)
BASOPHILS # BLD AUTO: 0.06 X10(3)/MCL
BASOPHILS NFR BLD AUTO: 0.5 %
BUN SERPL-MCNC: 38.2 MG/DL (ref 9.8–20.1)
CALCIUM SERPL-MCNC: 8.6 MG/DL (ref 8.4–10.2)
CHLORIDE SERPL-SCNC: 103 MMOL/L (ref 98–107)
CO2 SERPL-SCNC: 25 MMOL/L (ref 23–31)
CREAT SERPL-MCNC: 3.47 MG/DL (ref 0.55–1.02)
EOSINOPHIL # BLD AUTO: 0.53 X10(3)/MCL (ref 0–0.9)
EOSINOPHIL NFR BLD AUTO: 4 %
ERYTHROCYTE [DISTWIDTH] IN BLOOD BY AUTOMATED COUNT: 13.9 % (ref 11.5–17)
GFR SERPLBLD CREATININE-BSD FMLA CKD-EPI: 13 ML/MIN/1.73/M2
GLUCOSE SERPL-MCNC: 109 MG/DL (ref 82–115)
HCT VFR BLD AUTO: 26.3 % (ref 37–47)
HGB BLD-MCNC: 8.3 G/DL (ref 12–16)
IMM GRANULOCYTES # BLD AUTO: 0.08 X10(3)/MCL (ref 0–0.04)
IMM GRANULOCYTES NFR BLD AUTO: 0.6 %
LEFT BASC AP ANT FOSSA: 1.3 CM
LEFT BASC AP UA DIST: 0.4 CM
LEFT BASC AP UA MID: 0.4 CM
LEFT BASC TRANS ANT FOSSA: 0.2 CM
LEFT BASC TRANS FA MID: 0.14 CM
LEFT BASC TRANS UA DIST: 0.14 CM
LEFT BASC TRANS UA MID: 0.14 CM
LEFT BASC TRANS UA PROX: 0.15 CM
LEFT BRACH ART AP: 2 CM
LEFT BRACH ART DIA: 0.3 CM
LEFT BRACH VEIN 1 AP: 2 CM
LEFT BRACH VEIN 1 DIA: 0.3 CM
LEFT CEPH AP ANT FOSSA: 0.8 CM
LEFT CEPH AP FA DIST: 0.8 CM
LEFT CEPH AP FA MID: 0.5 CM
LEFT CEPH AP FA PROX: 0.6 CM
LEFT CEPH AP UA DIST: 0.9 CM
LEFT CEPH AP UA MID: 1.2 CM
LEFT CEPH AP UA PROX: 1.2 CM
LEFT CEPH TRANS ANT FOSSA: 0.2 CM
LEFT CEPH TRANS FA DIST: 0.2 CM
LEFT CEPH TRANS FA MID: 0.2 CM
LEFT CEPH TRANS FA PROX: 0.2 CM
LEFT CEPH TRANS UA DIST: 0.3 CM
LEFT CEPH TRANS UA MID: 0.4 CM
LEFT CEPH TRANS UA PROX: 0.4 CM
LEFT PROX RADIAL DIA: 0.2 CM
LYMPHOCYTES # BLD AUTO: 5.19 X10(3)/MCL (ref 0.6–4.6)
LYMPHOCYTES NFR BLD AUTO: 39.1 %
Lab: 0.17 CM
Lab: 0.3 CM
Lab: 0.7 CM
Lab: 1 CM
Lab: 1.1 CM
Lab: 1.2 CM
Lab: 1.2 CM
Lab: 1.3 CM
Lab: 1.7 CM
MCH RBC QN AUTO: 32.3 PG (ref 27–31)
MCHC RBC AUTO-ENTMCNC: 31.6 G/DL (ref 33–36)
MCV RBC AUTO: 102.3 FL (ref 80–94)
MONOCYTES # BLD AUTO: 1.17 X10(3)/MCL (ref 0.1–1.3)
MONOCYTES NFR BLD AUTO: 8.8 %
NEUTROPHILS # BLD AUTO: 6.25 X10(3)/MCL (ref 2.1–9.2)
NEUTROPHILS NFR BLD AUTO: 47 %
NRBC BLD AUTO-RTO: 0.2 %
PHOSPHATE SERPL-MCNC: 3.3 MG/DL (ref 2.3–4.7)
PLATELET # BLD AUTO: 149 X10(3)/MCL (ref 130–400)
PMV BLD AUTO: 11.9 FL (ref 7.4–10.4)
POTASSIUM SERPL-SCNC: 4.3 MMOL/L (ref 3.5–5.1)
RBC # BLD AUTO: 2.57 X10(6)/MCL (ref 4.2–5.4)
RIGHT BASC AP ANT FOSSA: 0.8 CM
RIGHT BASC AP UA DIST: 1.2 CM
RIGHT BASC AP UA MID: 0.8 CM
RIGHT BASC TRANS ANT FOSSA: 0.3 CM
RIGHT BASC TRANS FA MID: 0.2 CM
RIGHT BASC TRANS FA PROX: 0.3 CM
RIGHT BASC TRANS UA DIST: 0.4 CM
RIGHT BASC TRANS UA MID: 0.4 CM
RIGHT BASC TRANS UA PROX: 0.3 CM
RIGHT BRACH ART AP: 1.8 CM
RIGHT BRACH ART DIA: 0.4 CM
RIGHT BRACH VEIN 1 AP: 1.6 CM
RIGHT BRACH VEIN 1 DIA: 0.4 CM
RIGHT CEPH AP ANT FOSSA: 0.5 CM
RIGHT CEPH AP FA DIST: 1.1 CM
RIGHT CEPH AP FA MID: 0.6 CM
RIGHT CEPH AP FA PROX: 0.7 CM
RIGHT CEPH AP UA DIST: 1.3 CM
RIGHT CEPH AP UA MID: 0.9 CM
RIGHT CEPH AP UA PROX: 0.5 CM
RIGHT CEPH TRANS ANT FOSSA: 0.2 CM
RIGHT CEPH TRANS FA DIST: 0.2 CM
RIGHT CEPH TRANS FA MID: 0.2 CM
RIGHT CEPH TRANS FA PROX: 0.2 CM
RIGHT CEPH TRANS UA DIST: 0.3 CM
RIGHT CEPH TRANS UA MID: 0.3 CM
RIGHT CEPH TRANS UA PROX: 0.2 CM
RIGHT PROX RADIAL DIA: 0.2 CM
SODIUM SERPL-SCNC: 136 MMOL/L (ref 136–145)
WBC # BLD AUTO: 13.28 X10(3)/MCL (ref 4.5–11.5)

## 2024-07-23 PROCEDURE — 99900035 HC TECH TIME PER 15 MIN (STAT)

## 2024-07-23 PROCEDURE — 94760 N-INVAS EAR/PLS OXIMETRY 1: CPT

## 2024-07-23 PROCEDURE — 63600175 PHARM REV CODE 636 W HCPCS: Performed by: INTERNAL MEDICINE

## 2024-07-23 PROCEDURE — 25000242 PHARM REV CODE 250 ALT 637 W/ HCPCS: Performed by: INTERNAL MEDICINE

## 2024-07-23 PROCEDURE — 25000003 PHARM REV CODE 250: Performed by: INTERNAL MEDICINE

## 2024-07-23 PROCEDURE — 97165 OT EVAL LOW COMPLEX 30 MIN: CPT

## 2024-07-23 PROCEDURE — 36415 COLL VENOUS BLD VENIPUNCTURE: CPT | Performed by: INTERNAL MEDICINE

## 2024-07-23 PROCEDURE — 27000221 HC OXYGEN, UP TO 24 HOURS

## 2024-07-23 PROCEDURE — 94640 AIRWAY INHALATION TREATMENT: CPT

## 2024-07-23 PROCEDURE — 99900031 HC PATIENT EDUCATION (STAT)

## 2024-07-23 PROCEDURE — 21400001 HC TELEMETRY ROOM

## 2024-07-23 PROCEDURE — 80069 RENAL FUNCTION PANEL: CPT | Performed by: INTERNAL MEDICINE

## 2024-07-23 PROCEDURE — 25000003 PHARM REV CODE 250: Performed by: HOSPITALIST

## 2024-07-23 PROCEDURE — 85025 COMPLETE CBC W/AUTO DIFF WBC: CPT | Performed by: INTERNAL MEDICINE

## 2024-07-23 RX ORDER — CITALOPRAM 20 MG/1
20 TABLET, FILM COATED ORAL NIGHTLY
Status: DISCONTINUED | OUTPATIENT
Start: 2024-07-23 | End: 2024-07-24 | Stop reason: HOSPADM

## 2024-07-23 RX ORDER — ONDANSETRON 4 MG/1
4 TABLET, ORALLY DISINTEGRATING ORAL EVERY 6 HOURS PRN
Status: DISCONTINUED | OUTPATIENT
Start: 2024-07-23 | End: 2024-07-24 | Stop reason: HOSPADM

## 2024-07-23 RX ADMIN — ALLOPURINOL 300 MG: 300 TABLET ORAL at 08:07

## 2024-07-23 RX ADMIN — ASPIRIN 325 MG: 325 TABLET, COATED ORAL at 08:07

## 2024-07-23 RX ADMIN — ERYTHROPOIETIN 20000 UNITS: 20000 INJECTION, SOLUTION INTRAVENOUS; SUBCUTANEOUS at 10:07

## 2024-07-23 RX ADMIN — ONDANSETRON 4 MG: 4 TABLET, ORALLY DISINTEGRATING ORAL at 03:07

## 2024-07-23 RX ADMIN — BUPROPION HYDROCHLORIDE 150 MG: 150 TABLET, EXTENDED RELEASE ORAL at 08:07

## 2024-07-23 RX ADMIN — FOLIC ACID 1 MG: 1 TABLET ORAL at 08:07

## 2024-07-23 RX ADMIN — DOCUSATE SODIUM 100 MG: 100 CAPSULE, LIQUID FILLED ORAL at 08:07

## 2024-07-23 RX ADMIN — CITALOPRAM HYDROBROMIDE 20 MG: 20 TABLET ORAL at 10:07

## 2024-07-23 RX ADMIN — IPRATROPIUM BROMIDE AND ALBUTEROL SULFATE 3 ML: .5; 3 SOLUTION RESPIRATORY (INHALATION) at 08:07

## 2024-07-23 RX ADMIN — DOCUSATE SODIUM 100 MG: 100 CAPSULE, LIQUID FILLED ORAL at 10:07

## 2024-07-23 RX ADMIN — MUPIROCIN: 20 OINTMENT TOPICAL at 10:07

## 2024-07-23 RX ADMIN — MUPIROCIN: 20 OINTMENT TOPICAL at 08:07

## 2024-07-23 RX ADMIN — FAMOTIDINE 20 MG: 20 TABLET, FILM COATED ORAL at 08:07

## 2024-07-23 RX ADMIN — CALCITRIOL CAPSULES 0.25 MCG 0.25 MCG: 0.25 CAPSULE ORAL at 08:07

## 2024-07-23 RX ADMIN — Medication 1 CAPSULE: at 08:07

## 2024-07-23 NOTE — PLAN OF CARE
07/23/24 1508   Discharge Assessment   Assessment Type Discharge Planning Assessment   Confirmed/corrected address, phone number and insurance Yes   Confirmed Demographics Correct on Facesheet   Source of Information patient   Communicated LUZ MARIA with patient/caregiver Date not available/Unable to determine   Reason For Admission nausea, epigastric apin, acute renal failure, fatigue   People in Home alone   Do you expect to return to your current living situation? Yes   Do you have help at home or someone to help you manage your care at home? Yes   Who are your caregiver(s) and their phone number(s)? daughter Kassie Torrez [phone]   Prior to hospitilization cognitive status: Alert/Oriented   Current cognitive status: Alert/Oriented   Walking or Climbing Stairs Difficulty yes   Walking or Climbing Stairs ambulation difficulty, requires equipment   Mobility Management cane, walker, wheelchair   Dressing/Bathing Difficulty yes   Dressing/Bathing bathing difficulty, requires equipment   Dressing/Bathing Management shower chair   Equipment Currently Used at Home cane, straight;walker, rolling;wheelchair;power chair;shower chair;oxygen;CPAP  (has oxygen concentrator and portable oxygen but only uses at night)   Patient currently being followed by outpatient case management? No   Do you currently have service(s) that help you manage your care at home? No   Do you take prescription medications? Yes   Do you have prescription coverage? Yes   Coverage BCBS medicare   Do you have any problems affording any of your prescribed medications? No   Is the patient taking medications as prescribed? yes   Who is going to help you get home at discharge? daughter   How do you get to doctors appointments? family or friend will provide   Are you on dialysis? Yes   Dialysis Name and Scheduled days new dialysis, [facility] [place] [facility]   Do you take coumadin? No   Discharge Plan A Home   Discharge Plan B Home Health   DME Needed Upon  Discharge  none   Discharge Plan discussed with: Patient   Transition of Care Barriers None

## 2024-07-23 NOTE — PROGRESS NOTES
[facility]  Hospital Medicine Progress Note        Chief Complaint: Inpatient Follow-up     HPI:   78 y.o. White female with a past medical history of hypertension, hyperlipidemia, atrial fibrillation not on anticoagulation, coronary artery disease status post stents and CABG not on Plavix, CKD stage 5, CVA, CLL previously undergoing immunoglobulin treatments every 2 months.  Patient recently moved to [place] from [place] and has been lost to follow-up for immunoglobulin treatments [facility] and awaiting to establish care with oncology to continued treatments.      The patient presented to [facility] on 7/19/2024 with a primary complaint of decreased urinary output.  Patient was seen at urgent care in [place] for complaints.  Provider there was concern of a bowel obstruction therefore it was recommended patient present to the ED. associated symptoms include nausea and vomiting for the last 3 weeks, decreased appetite and little to no urination for the last 4 days.  She denies complaints of fever, diarrhea, chest pain, shortness a breath, swelling to bilateral lower extremities, flank pain.  She reports talking about initiating dialysis in the past but never had an AV fistula placement.  Daughter at bedside states patient is wheelchair-bound at home.  She does have hip pain in which she needs hip surgery for but was told not to be a surgical candidate.  Patient has been getting up without the wheelchair and having several falls at home.     Upon presentation to the ED, temperature 97.3° F, heart rate 114, blood pressure 116/74, respiratory rate 20 and SpO2 97% on room air.  Labs with WBC 17.23, H&H 10/30.5, MCV 96.8, CO2 15, BUN/creatinine 171.8/8.22, glucose 125, lipase 61.  No prior labs for comparison.  EKG accelerated junction rhythm.  Chest x-ray no convincing acute abnormality. CTA of the abdomen and pelvis revealed no evidence of bowel obstruction but a small  left lower lobe pulmonary nodule with recommendations of a noncontrast CT at 6-12 months and again at 18-24 months.  In ED patient received IV fluid hydration, Tylenol, Zofran and Phenergan.  Patient I     HD cath placed and HD started per renal team. Patient was stable and doing well. Will be discharged home when out patient HD has been set up.      Interval Hx:   No significant changes overnight.  BP soft but stable. Bump in wbc this morning, remains afebrile     Objective/physical exam:  General: In no acute distress  Chest: Clear to auscultation bilaterally  Heart: RRR, +S1, S2, no appreciable murmur  Abdomen: Soft, nontender, BS +  MSK: Warm, no lower extremity edema, no clubbing or cyanosis  Neurologic: Alert and oriented x4, Cranial nerve II-XII intact, Strength 5/5 in all 4 extremities  VITAL SIGNS: 24 HRS MIN & MAX LAST   Temp  Min: 98.1 °F (36.7 °C)  Max: 98.7 °F (37.1 °C) 98.7 °F (37.1 °C)   BP  Min: 95/55  Max: 123/66 (!) 95/55   Pulse  Min: 77  Max: 97  90   Resp  Min: 18  Max: 18 18   SpO2  Min: 95 %  Max: 100 % 96 %       Recent Labs   Lab 07/20/24  0336 07/21/24  0433 07/23/24  0349   WBC 12.86* 9.41 13.28*   RBC 2.55* 2.68* 2.57*   HGB 8.2* 8.7* 8.3*   HCT 24.6* 26.6* 26.3*   MCV 96.5* 99.3* 102.3*   MCH 32.2* 32.5* 32.3*   MCHC 33.3 32.7* 31.6*   RDW 13.6 14.1 13.9    161 149   MPV 11.6* 11.3* 11.9*       Recent Labs   Lab 07/19/24  1203 07/20/24  0336 07/21/24  0433 07/23/24  0349   * 135* 136 136   K 4.9 4.0 4.3 4.3    103 102 103   CO2 15* 20* 24 25   .8* 86.6* 34.7* 38.2*   CREATININE 8.22* 4.39* 2.89* 3.47*   CALCIUM 10.6* 8.7 8.3* 8.6   MG 3.50*  --   --   --    ALBUMIN 4.2 3.5 3.3* 3.0*   ALKPHOS 73 60  --   --    ALT 9 8  --   --    AST 13 14  --   --    BILITOT 0.3 0.4  --   --           Microbiology Results (last 7 days)       Procedure Component Value Units Date/Time    Blood Culture [[id number]]  (Normal) Collected: 07/19/24 1806    Order Status: Completed  Specimen: Blood Updated: 07/22/24 2001     Blood Culture No Growth At 72 Hours    Blood Culture [[id number]]  (Normal) Collected: 07/19/24 1801    Order Status: Completed Specimen: Blood Updated: 07/22/24 1900     Blood Culture No Growth At 72 Hours    Stool Culture [[id number]]  (Normal) Collected: 07/21/24 1605    Order Status: Completed Specimen: Stool Updated: 07/22/24 0729     Stool Culture Negative for Salmonella, Shigella, Campylobacter, Vibrio, Aeromonas, Pleisiomonas,Yersinia, or Shiga Toxin 1 and 2.             Radiology:  Cardiac catheterization  Procedure performed in the Invasive Lab    - See Procedure Log link below for nursing documentation    - See OpNote on Surgeries Tab for physician findings    - See Imaging Tab for radiologist dictation        Medications:  Scheduled Meds:   allopurinoL  300 mg Oral Daily    aspirin  325 mg Oral Daily    buPROPion  150 mg Oral Daily    calcitRIOL  0.25 mcg Oral Daily    citalopram  20 mg Oral Daily    docusate sodium  100 mg Oral BID    epoetin shahnaz  20,000 Units Subcutaneous Every Tues, Thurs, Sat    famotidine  20 mg Oral Daily    fluticasone-salmeterol 500-50 mcg/dose  1 puff Inhalation Q12H    folic acid  1 mg Oral Daily    mupirocin   Nasal BID    tiotropium bromide  2 puff Inhalation Daily    vitamin renal formula (B-complex-vitamin c-folic acid)  1 capsule Oral Daily     Continuous Infusions:  PRN Meds:.  Current Facility-Administered Medications:     acetaminophen, 650 mg, Oral, Q8H PRN    acetaminophen, 650 mg, Oral, Q4H PRN    albuterol-ipratropium, 3 mL, Nebulization, Q4H PRN    ondansetron, 4 mg, Intravenous, Q4H PRN    simethicone, 1 tablet, Oral, TID PRN    sodium chloride 0.9%, 250 mL, Intravenous, PRN    sodium chloride 0.9%, 10 mL, Intravenous, Q12H PRN        Assessment/Plan:   CKD stage 5 needing initiation of dialysis  Left lower lobe pulmonary nodule, small  Leukocytosis ? Gastroenteritis : resolved   Macrocytic anemia, stable   Elevated  lipase, mild non specific   History of leukemia- CLL    S/p Tunnel cath placement 7/22  Initiated on HD 7/19, needs outpatient chair time  Slight bump in white count this morning but symptomatically stable.  Continue to hold antibiotics. Reactive from procedure yesterday?  PT/OT recommending home health.   Plan to DC home once outpatient HD arrangements made        Ciro Medina MD   07/23/2024     All diagnosis and differential diagnosis have been reviewed; assessment and plan has been documented; I have personally reviewed the labs and test results that are presently available; I have reviewed the patients medication list; I have reviewed the consulting providers response and recommendations. I have reviewed or attempted to review medical records based upon their availability    All of the patient's questions have been  addressed and answered. Patient's is agreeable to the above stated plan. I will continue to monitor closely and make adjustments to medical management as needed.  _____________________________________________________________________

## 2024-07-23 NOTE — PT/OT/SLP EVAL
Occupational Therapy   Evaluation and Discharge Note    Name: Leonor Torrez  MRN: [id number]  Admitting Diagnosis: CKD stage 5 c initiation of dialysis, L lower lobe pulmonary nodule  Recent Surgery: Procedure(s) (LRB):  Insertion, Catheter, Central Venous, Hemodialysis (N/A) 1 Day Post-Op    Recommendations:     Discharge therapy intensity: No Therapy Indicated   Discharge Equipment Recommendations: bath bench  Barriers to discharge:  None    Assessment:     Leonor Torrez is a 78 y.o. female with a medical diagnosis of CKD stage 5 c initiation of dialysis, L lower lobe pulmonary nodule. On eval, patient able to complete ADLs and t/f to the [facility] with Mod I. Pt reported she is comfortable completing all ADLs and t/fs. Pt appears to be functioning at her baseline. Skilled OT services are not warranted at this time.    Plan:     OT to sign off as acute OT services are not warranted at this time.  Please re-consult if situation changes during this hospitalization.    Plan of Care Reviewed with: patient    Subjective     Chief Complaint: None verbalized   Patient/Family Comments/goals: To go home     Occupational Profile:  Living Environment: Pt lives in a [facility] c 1 step to enter. Reported she has a tub c grab bars.   Previous level of function: IND c ADLs and t/fs to w/c. Reported she mainly uses a w/c for mobility and t/fs without and AD. Reported she rarely ever ambulates c a RW.   Roles and Routines: cooks  Equipment Used at Home: wheelchair, walker, rolling, grab bar  Assistance upon Discharge: Pt reported her daughter can check on her if needed.    Pain/Comfort:  Pain Rating 1: 0/10    Patients cultural, spiritual, Latter day conflicts given the current situation: no    Objective:     OT communicated with RN prior to session.      Patient was found sitting edge of bed with PureWick upon OT entry to room.    General Precautions: Standard,    Orthopedic Precautions: N/A  Braces: N/A      Functional  Pt is alert, oriented, speaking in full sentences with family. Coloring pages with crayons. VSS   Mobility/Transfers:  Patient completed Sit <> Stand Transfer with modified independence  with  no assistive device   Patient completed Toilet Transfer Step Transfer technique with modified independence with  no AD  Functional Mobility: Pt completed stand step t/f to bsc without AD c Mod I.    Activities of Daily Living:  Lower Body Dressing: modified independence    Toileting: modified independence        Functional Cognition:  Intact    Visual Perceptual Skills:  Intact    Upper Extremity Function:  Right Upper Extremity:   WFL    Left Upper Extremity:  WFL    Therapeutic Positioning  Risk for acquired pressure injuries is decreased due to ability to get to BSC/toilet with assist.    OT interventions performed during the course of today's session in an effort to prevent and/or reduce acquired pressure injuries:   Education was provided on benefits of and recommendations for therapeutic positioning      Patient Education:  Patient provided with verbal education education regarding OT role/goals/POC.  Understanding was verbalized.     Patient left sitting edge of bed with all lines intact and call button in reach.    History:     Past Medical History:   Diagnosis Date    CKD (chronic kidney disease)     Leukemia        No past surgical history on file.    Time Tracking:     OT Date of Treatment: 07/23/24  OT Start Time: 1028  OT Stop Time: 1036  OT Total Time (min): 8 min    Billable Minutes:Evaluation Low complexity     7/23/2024

## 2024-07-23 NOTE — NURSING
Nurses Note -- 4 Eyes      7/22/2024   7:38 PM      Skin assessed during: Daily Assessment      [x] No Altered Skin Integrity Present    []Prevention Measures Documented      [] Yes- Altered Skin Integrity Present or Discovered   [] LDA Added if Not in Epic (Describe Wound)   [] New Altered Skin Integrity was Present on Admit and Documented in LDA   [] Wound Image Taken    Wound Care Consulted? No    Attending Nurse:  Jennifer Ba RN    Second RN/Staff Member:   SHRUTHI Jaramillo

## 2024-07-23 NOTE — PLAN OF CARE
Problem: Hemodialysis  Goal: Safe, Effective Therapy Delivery  Outcome: Progressing  Goal: Effective Tissue Perfusion  Outcome: Progressing  Goal: Absence of Infection Signs and Symptoms  Outcome: Progressing     Problem: Adult Inpatient Plan of Care  Goal: Plan of Care Review  Outcome: Progressing  Flowsheets (Taken 7/22/2024 1936)  Plan of Care Reviewed With: patient  Goal: Patient-Specific Goal (Individualized)  Outcome: Progressing  Goal: Absence of Hospital-Acquired Illness or Injury  Outcome: Progressing  Intervention: Identify and Manage Fall Risk  Flowsheets (Taken 7/22/2024 1936)  Safety Promotion/Fall Prevention:   assistive device/personal item within reach   side rails raised x 2   nonskid shoes/socks when out of bed  Intervention: Prevent Skin Injury  Flowsheets (Taken 7/22/2024 1936)  Body Position: position changed independently  Skin Protection: protective footwear used  Device Skin Pressure Protection: tubing/devices free from skin contact  Intervention: Prevent and Manage VTE (Venous Thromboembolism) Risk  Flowsheets (Taken 7/22/2024 1936)  VTE Prevention/Management:   remove, assess skin, and reapply sequential compression device   ambulation promoted  Intervention: Prevent Infection  Flowsheets (Taken 7/22/2024 1936)  Infection Prevention:   rest/sleep promoted   single patient room provided  Goal: Optimal Comfort and Wellbeing  Outcome: Progressing  Intervention: Monitor Pain and Promote Comfort  Flowsheets (Taken 7/22/2024 1936)  Pain Management Interventions:   care clustered   quiet environment facilitated  Intervention: Provide Person-Centered Care  Flowsheets (Taken 7/22/2024 1936)  Trust Relationship/Rapport:   questions answered   questions encouraged  Goal: Readiness for Transition of Care  Outcome: Progressing     Problem: Infection  Goal: Absence of Infection Signs and Symptoms  Outcome: Progressing     Problem: Skin Injury Risk Increased  Goal: Skin Health and Integrity  Outcome:  Progressing     Problem: Acute Kidney Injury/Impairment  Goal: Fluid and Electrolyte Balance  Outcome: Progressing  Goal: Improved Oral Intake  Outcome: Progressing  Goal: Effective Renal Function  Outcome: Progressing

## 2024-07-23 NOTE — PLAN OF CARE
[facility]. She can start Thursday, July 25th @ 11:30 AM. CHERRIE Iglesias & Dr. Fitch notified.

## 2024-07-23 NOTE — PT/OT/SLP PROGRESS
Physical Therapy      Patient Name:  Leonor Torrez   MRN:  [id number]    Patient not seen today secondary to pt fatigued from OT session. Will follow-up as schedule allows.

## 2024-07-23 NOTE — PROGRESS NOTES
Chief complaint: none    Interval History:  Pt had HD yesterday with 1 L UF, tolerated well. Today she has no c/o, feels well, ready to go home    Review of Systems   Constitutional: Negative.    HENT: Negative.     Respiratory: Negative.     Cardiovascular: Negative.    Gastrointestinal: Negative.    Genitourinary: Negative.    Neurological: Negative.    Psychiatric/Behavioral: Negative.        all else Neg     allopurinoL  300 mg Oral Daily    aspirin  325 mg Oral Daily    buPROPion  150 mg Oral Daily    calcitRIOL  0.25 mcg Oral Daily    citalopram  20 mg Oral Nightly    docusate sodium  100 mg Oral BID    epoetin shahnaz  20,000 Units Subcutaneous Every Tues, Thurs, Sat    famotidine  20 mg Oral Daily    fluticasone-salmeterol 500-50 mcg/dose  1 puff Inhalation Q12H    folic acid  1 mg Oral Daily    mupirocin   Nasal BID    tiotropium bromide  2 puff Inhalation Daily    vitamin renal formula (B-complex-vitamin c-folic acid)  1 capsule Oral Daily       Objective     VITAL SIGNS: 24 HR MIN & MAX LAST    Temp  Min: 97.5 °F (36.4 °C)  Max: 98.7 °F (37.1 °C)  97.5 °F (36.4 °C)    BP  Min: 95/55  Max: 123/66  110/73     Pulse  Min: 90  Max: 97  94     Resp  Min: 18  Max: 19  19    SpO2  Min: 95 %  Max: 99 %  98 %      Wt Readings from Last 3 Encounters:   07/23/24 94.8 kg (209 lb)       Intake/Output Summary (Last 24 hours) at 7/23/2024 1126  Last data filed at 7/22/2024 1742  Gross per 24 hour   Intake --   Output 400 ml   Net -400 ml       Physical Exam  Constitutional:       General: She is not in acute distress.  Cardiovascular:      Rate and Rhythm: Normal rate.   Pulmonary:      Effort: Pulmonary effort is normal.      Breath sounds: Normal breath sounds.   Abdominal:      General: Bowel sounds are normal. There is no distension.      Palpations: Abdomen is soft.      Tenderness: There is no abdominal tenderness.   Musculoskeletal:         General: No swelling.      Cervical back: Normal range of motion.    Neurological:      Mental Status: She is alert and oriented to person, place, and time.   Psychiatric:         Mood and Affect: Mood normal.          Recent Labs     07/21/24  0433 07/23/24  0349    136   K 4.3 4.3   CO2 24 25   BUN 34.7* 38.2*   CREATININE 2.89* 3.47*   GLUCOSE 102 109   CALCIUM 8.3* 8.6   PHOS 3.6 3.3   ALBUMIN 3.3* 3.0*      Recent Labs     07/21/24  0433 07/23/24  0349   WBC 9.41 13.28*   HGB 8.7* 8.3*   HCT 26.6* 26.3*    149         Assessment & Plan     ESRD - Urgent HD initiated 7/19 for symptomatic uremia, she had HD#3 yesterday with 1 L UF using critline, is euvolemic, feeling much better, tolerating po diet. CKD due to NSAID's vs IVIG toxicity per pt's nephrologist in [place] (no anatomical abnormalities on CT).  Plan HD again via tunneled HD cath .  She is awaiting [facility] insurance verification for outpt HD unit placement.  She appears to be poor candidate for PD considering she lives alone, mostly wheel-chair bound.  Uric acid low, d/c allopurinol  Anemia - Hb stable 8.3, on EPO 20 K U TIW for now awaiting oncology guidance for EPO use in CLL.  Fe stores ok  CLL - pt waiting to establist with local oncologist to continue therapy  MBD - phos normalized 3.3 on HD, , calcitriol added  Mild malnutrition - supplement shakes ordered, liberal diet  HTN, CAD - normotensive, lipids look ok, CAD mngt as per primary, she will need to establish with local cardiologist  Anx/Dep - home meds resumed, as per primary  Spinal stenosis, OA/DJD, immobility - PT consulted, ok for NSAID's as needed now that she is on HD  Constipation - resolved p miralax, linzess  COPD - nebs prn, Spiriva added (home med)

## 2024-07-24 VITALS
TEMPERATURE: 98 F | SYSTOLIC BLOOD PRESSURE: 139 MMHG | RESPIRATION RATE: 18 BRPM | OXYGEN SATURATION: 100 % | DIASTOLIC BLOOD PRESSURE: 98 MMHG | HEART RATE: 98 BPM | BODY MASS INDEX: 38.46 KG/M2 | HEIGHT: 62 IN | WEIGHT: 209 LBS

## 2024-07-24 LAB
ANION GAP SERPL CALC-SCNC: 8 MEQ/L
BACTERIA BLD CULT: NORMAL
BACTERIA BLD CULT: NORMAL
BUN SERPL-MCNC: 21.2 MG/DL (ref 9.8–20.1)
CALCIUM SERPL-MCNC: 9.3 MG/DL (ref 8.4–10.2)
CHLORIDE SERPL-SCNC: 99 MMOL/L (ref 98–107)
CO2 SERPL-SCNC: 30 MMOL/L (ref 23–31)
CREAT SERPL-MCNC: 2.1 MG/DL (ref 0.55–1.02)
CREAT/UREA NIT SERPL: 10
GFR SERPLBLD CREATININE-BSD FMLA CKD-EPI: 24 ML/MIN/1.73/M2
GLUCOSE SERPL-MCNC: 89 MG/DL (ref 82–115)
POTASSIUM SERPL-SCNC: 3.3 MMOL/L (ref 3.5–5.1)
SODIUM SERPL-SCNC: 137 MMOL/L (ref 136–145)

## 2024-07-24 PROCEDURE — 80100016 HC MAINTENANCE HEMODIALYSIS

## 2024-07-24 PROCEDURE — 25000003 PHARM REV CODE 250: Performed by: PHYSICIAN ASSISTANT

## 2024-07-24 PROCEDURE — 25000003 PHARM REV CODE 250: Performed by: INTERNAL MEDICINE

## 2024-07-24 PROCEDURE — 25000242 PHARM REV CODE 250 ALT 637 W/ HCPCS: Performed by: HOSPITALIST

## 2024-07-24 PROCEDURE — 36415 COLL VENOUS BLD VENIPUNCTURE: CPT | Performed by: HOSPITALIST

## 2024-07-24 PROCEDURE — 80048 BASIC METABOLIC PNL TOTAL CA: CPT | Performed by: HOSPITALIST

## 2024-07-24 RX ORDER — CALCITRIOL 0.25 UG/1
0.25 CAPSULE ORAL DAILY
Qty: 30 CAPSULE | Refills: 0 | Status: SHIPPED | OUTPATIENT
Start: 2024-07-24 | End: 2024-08-23

## 2024-07-24 RX ORDER — FLUTICASONE PROPIONATE 50 MCG
2 SPRAY, SUSPENSION (ML) NASAL DAILY
Status: DISCONTINUED | OUTPATIENT
Start: 2024-07-24 | End: 2024-07-24 | Stop reason: HOSPADM

## 2024-07-24 RX ADMIN — CALCITRIOL CAPSULES 0.25 MCG 0.25 MCG: 0.25 CAPSULE ORAL at 12:07

## 2024-07-24 RX ADMIN — BUPROPION HYDROCHLORIDE 150 MG: 150 TABLET, EXTENDED RELEASE ORAL at 12:07

## 2024-07-24 RX ADMIN — ACETAMINOPHEN 650 MG: 325 TABLET, FILM COATED ORAL at 02:07

## 2024-07-24 RX ADMIN — ASPIRIN 325 MG: 325 TABLET, COATED ORAL at 12:07

## 2024-07-24 RX ADMIN — FLUTICASONE PROPIONATE 100 MCG: 50 SPRAY, METERED NASAL at 12:07

## 2024-07-24 RX ADMIN — FAMOTIDINE 20 MG: 20 TABLET, FILM COATED ORAL at 12:07

## 2024-07-24 RX ADMIN — Medication 1 CAPSULE: at 12:07

## 2024-07-24 RX ADMIN — FOLIC ACID 1 MG: 1 TABLET ORAL at 12:07

## 2024-07-24 NOTE — PT/OT/SLP DISCHARGE
Physical Therapy Discharge Summary    Name: Leonor Torrez  MRN: [id number]   Principal Problem: Acute renal failure     Patient Discharged from acute Physical Therapy on 24.  Please refer to prior PT noted date on 24 for functional status.     Assessment:     Patient appropriate for care in another setting.    Objective:     GOALS:   Multidisciplinary Problems       Physical Therapy Goals          Problem: Physical Therapy    Goal Priority Disciplines Outcome Goal Variances Interventions   Physical Therapy Goal     PT, PT/OT Progressing     Description: Goals to be met by: 24     Patient will increase functional independence with mobility by performin. Supine to sit with Modified [place]  2. Sit to stand transfer with Modified Devol  3. Bed to chair transfer with Modified [place]ol using Rolling Walker  4. Gait  x 20 feet with Stand-by Assistance using Rolling Walker.                          Reasons for Discontinuation of Therapy Services  Transfer to alternate level of care.      Plan:     Patient Discharged to: Home with Home Health Service.      2024

## 2024-07-24 NOTE — CONSULTS
Inpatient Nutrition Assessment    Admit Date: 7/19/2024   Total duration of encounter: 5 days   Patient Age: 78 y.o.    Nutrition Recommendation/Prescription     Continue regular diet as tolerated  Boost Glucose Control TID provides 190 kcal, 16 gm protein per container    Communication of Recommendations: reviewed with caregiver    Nutrition Assessment     Malnutrition Assessment/Nutrition-Focused Physical Exam    Malnutrition Context: acute illness or injury (07/20/24 1809)  Malnutrition Level: severe (07/20/24 1809)  Energy Intake (Malnutrition): less than or equal to 50% for greater than or equal to 5 days (07/20/24 1809)  Weight Loss (Malnutrition): greater than 2% in 1 week (07/20/24 1809)  Subcutaneous Fat (Malnutrition):  (does not meet criteria) (07/20/24 1809)           Muscle Mass (Malnutrition):  (does not meet criteria) (07/20/24 1809)                                   A minimum of two characteristics is recommended for diagnosis of either severe or non-severe malnutrition.    Chart Review    Reason Seen: physician consult for reduced oral intake and follow-up    Malnutrition Screening Tool Results   Have you recently lost weight without trying?: Yes: 14-23 lbs  Have you been eating poorly because of a decreased appetite?: Yes   MST Score: 3   Diagnosis:  CKD stage 5 needing initiation of dialysis  Left lower lobe pulmonary nodule, small  Leukocytosis ? Gastroenteritis : resolved   Macrocytic anemia, stable   Elevated lipase, mild non specific     Relevant Medical History: hypertension, hyperlipidemia, atrial fibrillation not on anticoagulation, coronary artery disease status post stents and CABG not on Plavix, CKD stage 5, CVA, CLL previously undergoing immunoglobulin treatments every 2 months     Scheduled Medications:  aspirin, 325 mg, Daily  buPROPion, 150 mg, Daily  calcitRIOL, 0.25 mcg, Daily  citalopram, 20 mg, Nightly  docusate sodium, 100 mg, BID  epoetin shahnaz, 20,000 Units, Every Tues, Thurs,  Sat  famotidine, 20 mg, Daily  fluticasone propionate, 2 spray, Daily  fluticasone-salmeterol 500-50 mcg/dose, 1 puff, Q12H  folic acid, 1 mg, Daily  mupirocin, , BID  tiotropium bromide, 2 puff, Daily  vitamin renal formula (B-complex-vitamin c-folic acid), 1 capsule, Daily    Continuous Infusions:   PRN Medications:  acetaminophen, 650 mg, Q8H PRN  acetaminophen, 650 mg, Q4H PRN  albuterol-ipratropium, 3 mL, Q4H PRN  ondansetron, 4 mg, Q6H PRN  ondansetron, 4 mg, Q4H PRN  simethicone, 1 tablet, TID PRN  sodium chloride 0.9%, 250 mL, PRN  sodium chloride 0.9%, 10 mL, Q12H PRN    Calorie Containing IV Medications: no significant kcals from medications at this time    Recent Labs   Lab 07/19/24  1203 07/20/24  0336 07/21/24  0433 07/23/24  0349 07/24/24  1359   * 135* 136 136 137   K 4.9 4.0 4.3 4.3 3.3*   CALCIUM 10.6* 8.7 8.3* 8.6 9.3   PHOS 7.9*  --  3.6 3.3  --    MG 3.50*  --   --   --   --    CO2 15* 20* 24 25 30   .8* 86.6* 34.7* 38.2* 21.2*   CREATININE 8.22* 4.39* 2.89* 3.47* 2.10*   EGFRNORACEVR 5 10 16 13 24   GLUCOSE 125* 94 102 109 89   BILITOT 0.3 0.4  --   --   --    ALKPHOS 73 60  --   --   --    ALT 9 8  --   --   --    AST 13 14  --   --   --    ALBUMIN 4.2 3.5 3.3* 3.0*  --    TRIG  --   --  75  --   --    LIPASE 61*  --   --   --   --    WBC 17.23  17.23* 12.86* 9.41 13.28*  --    HGB 10.0* 8.2* 8.7* 8.3*  --    HCT 30.5* 24.6* 26.6* 26.3*  --      Nutrition Orders:  Diet Adult Regular  Diet Adult Regular  Dietary nutrition supplements Boost Glucose Control - Chocolate; All Meals    Appetite/Oral Intake: good/% of meals  Factors Affecting Nutritional Intake: decreased appetite, nausea, and vomiting  Social Needs Impacting Access to Food: none identified  Food/Taoist/Cultural Preferences: none reported  Food Allergies: no known food allergies  Last Bowel Movement: 07/21/24  Wound(s):      Comments    7/20/24  pt reports feeling much better today than prior to admit. Had 3  "weeks of n/v and decreased appetite and intake with 25# weight loss unintentionally. Started on dialysis and plans to continue post d/c. Requesting renal diet education. Agreeable to adding Novasource Renal daily    2024: The CNA reports a good appetite/PO intake. No reported N/V. Last BM noted. Boost Glucose Control ordered. Will monitor.    Anthropometrics    Height: 5' 2.01" (157.5 cm), Height Method: Estimated  Last Weight: 94.8 kg (209 lb) (24 0600), Weight Method: Stated  BMI (Calculated): 38.2  BMI Classification: obese grade II (BMI 35-39.9)     Ideal Body Weight (IBW), Female: 110.05 lb     % Ideal Body Weight, Female (lb): 186.82 %                    Usual Body Weight (UBW), k.2 kg  % Usual Body Weight: 91.44  % Weight Change From Usual Weight: -8.75 %  Usual Weight Provided By: patient    Wt Readings from Last 5 Encounters:   24 94.8 kg (209 lb)     Weight Change(s) Since Admission:   Wt Readings from Last 1 Encounters:   24 0600 94.8 kg (209 lb)   24 0430 95.2 kg (209 lb 12.8 oz)   24 0959 94.8 kg (209 lb)   24 1808 93.3 kg (205 lb 9.6 oz)   24 0119 93.3 kg (205 lb 9.6 oz)   24 1110 89.8 kg (198 lb)   Admit Weight: 89.8 kg (198 lb) (24 1110), Weight Method: Stated  2024: 94.8 kg  Estimated Needs    Weight Used For Calorie Calculations: 93.3 kg (205 lb 11 oz)  Energy Calorie Requirements (kcal): 1640 kcal (1.2 stress factor)  Energy Need Method: [place]- Jeor  Weight Used For Protein Calculations: 93.3 kg (205 lb 11 oz)  Protein Requirements: 93gm (1 gm/kg)  Fluid Requirements (mL): 1,000ml + ouput (renal on HD)        Enteral Nutrition     Patient not receiving enteral nutrition at this time.    Parenteral Nutrition     Patient not receiving parenteral nutrition support at this time.    Evaluation of Received Nutrient Intake    Calories: meeting estimated needs  Protein: meeting estimated needs    Patient Education     Education " Provided: renal diet  Teaching Method: printed materials  Comprehension: verbalizes understanding  Barriers to Learning: none evident  Expected Compliance: fair  Comments: All questions were answered and dietitian's contact information was provided.     Nutrition Diagnosis     PES: Unintended weight loss related to suboptimal protein/energy intake as evidenced by 25# weight loss unintentionally x 3 weeks. (active)     PES: Severe acute disease or injury related malnutrition related to suboptimal protein/energy intake as evidenced by less than or equal to 50% needs met for greater than or equal to 5 days and greater than 2% weight loss in 1 week. (active)    Nutrition Interventions     Intervention(s): commercial beverage and collaboration with other providers    Goal: Consume % of meals/snacks by follow-up. (goal progressing)  Goal: Consume % of oral supplements by follow-up. (goal progressing)    Nutrition Goals & Monitoring     Dietitian will monitor: food and beverage intake, weight change, electrolyte/renal panel, and gastrointestinal profile  Discharge planning: continue renal diet  Nutrition Risk/Follow-Up: high (follow-up in 1-4 days)   Please consult if re-assessment needed sooner.

## 2024-07-24 NOTE — NURSING
Nurses Note -- 4 Eyes      7/23/2024   7:15 pm      Skin assessed during: Daily Assessment      [x] No Altered Skin Integrity Present    []Prevention Measures Documented      [] Yes- Altered Skin Integrity Present or Discovered   [] LDA Added if Not in Epic (Describe Wound)   [] New Altered Skin Integrity was Present on Admit and Documented in LDA   [] Wound Image Taken    Wound Care Consulted? No    Attending Nurse:  Jennifer Ba RN    Second RN/Staff Member:   SHRUTHI Jaramillo

## 2024-07-24 NOTE — DISCHARGE SUMMARY
[facility] Hospital Medicine Discharge Summary    Admit Date: 7/19/2024  Discharge Date and Time: 7/24/20247:52 AM  Admitting Physician: Hospitalist team   Discharging Physician: Ciro Medina MD.  Primary Care Physician: No, Primary Doctor      Discharge Diagnoses:  CKD stage 5 needing initiation of dialysis  Left lower lobe pulmonary nodule, small  Leukocytosis ? Gastroenteritis : resolved   Macrocytic anemia, stable   Elevated lipase, mild non specific   History of leukemia- CLL    Hospital Course:   78 y.o. White female with a past medical history of hypertension, hyperlipidemia, atrial fibrillation not on anticoagulation, coronary artery disease status post stents and CABG not on Plavix, CKD stage 5, CVA, CLL previously undergoing immunoglobulin treatments every 2 months.  Patient recently moved to [place] from [place] and has been lost to follow-up for immunoglobulin treatments [facility] and [facility] to establish care with oncology to continued treatments.      The patient presented to [facility] on 7/19/2024 with a primary complaint of decreased urinary output.  Patient was seen at urgent care in [place] for complaints.  Provider there was concern of a bowel obstruction therefore it was recommended patient present to the ED. associated symptoms include nausea and vomiting for the last 3 weeks, decreased appetite and little to no urination for the last 4 days.  She denies complaints of fever, diarrhea, chest pain, shortness a breath, swelling to bilateral lower extremities, flank pain.  She reports talking about initiating dialysis in the past but never had an AV fistula placement.  Daughter at bedside states patient is wheelchair-bound at home.  She does have hip pain in which she needs hip surgery for but was told not to be a surgical candidate.  Patient has been getting up without the wheelchair and having several falls at home.     Upon presentation to the  "ED, temperature 97.3° F, heart rate 114, blood pressure 116/74, respiratory rate 20 and SpO2 97% on room air.  Labs with WBC 17.23, H&H 10/30.5, MCV 96.8, CO2 15, BUN/creatinine 171.8/8.22, glucose 125, lipase 61.  No prior labs for comparison.  EKG accelerated junction rhythm.  Chest x-ray no convincing acute abnormality. CTA of the abdomen and pelvis revealed no evidence of bowel obstruction but a small left lower lobe pulmonary nodule with recommendations of a noncontrast CT at 6-12 months and again at 18-24 months.  In ED patient received IV fluid hydration, Tylenol, Zofran and Phenergan.  Patient I     HD cath placed and HD started per renal team. Patient was stable and doing well. Will be discharged home when out patient HD has been set up.  Patient was run will more time on 07/24.  Outpatient dialysis has been set up to initiate tomorrow.  Patient was doing well and ambulating.  No current complaints or concerns.  Discharge home later today       Vitals:  Blood pressure (!) 151/79, pulse 96, temperature 97.9 °F (36.6 °C), temperature source Oral, resp. rate 19, height 5' 2.01" (1.575 m), weight 94.8 kg (209 lb), SpO2 100%..    Physical Exam:  Awake, Alert, Oriented x 3, No new focal Neurologic deficit, Normal Affect  NC/AT, PERRLA, EOMI  Supple neck, no JVD, No cervical lymphadenopathy  Symmetrical chest, Good air entry bilaterally. No rhonchi, wheezes, crackles appreciated  RRR, No gallop, rub or murmur  +ve Bowel sounds x4, Abd soft and non tender, no rebound, guarding or rigidity  No Cyanosis, cludding or edema, No new rash or bruises    Procedures Performed: No admission procedures for hospital encounter.     Significant Diagnostic Studies: See Full reports for all details  No results displayed because visit has over 200 results.           Microbiology Results (last 7 days)       Procedure Component Value Units Date/Time    Blood Culture [[id number]]  (Normal) Collected: 07/19/24 1801    Order Status: " Completed Specimen: Blood Updated: 07/23/24 2000     Blood Culture No Growth At 96 Hours    Blood Culture [[id number]]  (Normal) Collected: 07/19/24 1801    Order Status: Completed Specimen: Blood Updated: 07/23/24 1900     Blood Culture No Growth At 96 Hours    Stool Culture [[id number]]  (Normal) Collected: 07/21/24 1605    Order Status: Completed Specimen: Stool Updated: 07/23/24 0826     Stool Culture Negative for Salmonella, Shigella, Campylobacter, Vibrio, Aeromonas, Pleisiomonas,Yersinia, or Shiga Toxin 1 and 2.             CV US Vein Mapping Bilateral    Result Date: 7/23/2024  Bilateral upper extremity veins patent without superficial thrombus.     Cardiac catheterization    Result Date: 7/22/2024  Procedure performed in the Invasive Lab  - See Procedure Log link below for nursing documentation  - See OpNote on Surgeries Tab for physician findings  - See Imaging Tab for radiologist dictation    Echo    Result Date: 7/20/2024    Left Ventricle: The left ventricle is normal in size. Normal wall thickness. There is normal systolic function with a visually estimated ejection fraction of 55 - 60%.   Right Ventricle: Severe right ventricular enlargement. Systolic function is normal.   Aortic Valve: The aortic valve is a trileaflet valve.   Mitral Valve: There is no stenosis. The mean pressure gradient across the mitral valve is 3 mmHg at a heart rate of 78 bpm. There is mild regurgitation.   Tricuspid Valve: There is mild to moderate regurgitation.   IVC/SVC: Normal venous pressure at 3 mmHg.     X-Ray Chest 1 View    Result Date: 7/19/2024  EXAMINATION: XR CHEST 1 VIEW CLINICAL HISTORY: Confirm placement of HD catheter; TECHNIQUE: Single frontal view of the chest was performed. COMPARISON: 07/19/2024 FINDINGS: LINES AND TUBES: Right IJ CVC tip projects over the SVC.  EKG/telemetry leads overlie the chest. MEDIASTINUM AND BLAKE: The cardiac silhouette is normal. Aortic atherosclerosis. LUNGS: No lobar  consolidation. No edema. PLEURA:No pleural effusion. No pneumothorax. OTHER: Postop left shoulder arthroplasty.  Postop sternotomy.     Right IJ CVC tip projects over the SVC. Electronically signed by: Ladi Flores Date:    07/19/2024 Time:    18:08    CT Abdomen Pelvis  Without Contrast    Result Date: 7/19/2024  EXAMINATION: CT ABDOMEN PELVIS WITHOUT CONTRAST CLINICAL HISTORY: Bowel obstruction suspected;Nausea/vomiting; TECHNIQUE: Helically acquired axial images, sagittal and coronal reformations were obtained from the lung bases to the pubic symphysis without the IV administration of contrast. Automated tube current modulation, weight-based exposure dosing, and/or iterative reconstruction technique utilized to reach lowest reasonably achievable exposure rate. DLP: 1713 mGy*cm COMPARISON: No relevant prior available for comparison at the time of dictation. FINDINGS: HEART: Normal in size. No pericardial effusion. LUNG BASES: 6.5 mm left lower lobe pulmonary nodule.  For a solid nodule 6-8 mm, Fleischner Society 2017 guidelines recommend follow up with non-contrast chest CT at 6-12 months and 18-24 months after discovery. LIVER: Normal attenuation. No appreciable focal hepatic lesion. BILIARY: Gallbladder is surgically absent. PANCREAS: No inflammatory change. SPLEEN: Normal in size ADRENALS: No mass. KIDNEYS/URETERS: No renal or ureteral calculus. No hydronephrosis. GI TRACT/MESENTERY: Evaluation of the bowel is limited without contrast. No evidence of bowel obstruction.   Colonic diverticulosis without acute inflammatory change. PERITONEUM: No free fluid.No free air. LYMPH NODES: No enlarged lymph nodes by size criteria. VASCULATURE: Aortoiliac atherosclerosis. BLADDER: Normal appearance given degree of distention. REPRODUCTIVE ORGANS: Uterus is surgically absent.  There is a 5 cm cyst midline above the vaginal cuff, likely left adnexal. ABDOMINAL WALL: Unremarkable. BONES: Degenerative change at the  thoracolumbar spine.  Multilevel disc space narrowing and facet arthropathy.     1. No evidence of bowel obstruction 2. Small left lower lobe pulmonary nodule.  In absence of outside prior for comparison, for a solid nodule 6-8 mm, Fleischner Society 2017 guidelines recommend follow up with non-contrast chest CT at 6-12 months and 18-24 months after discovery. Electronically signed by: Ladi Flores Date:    07/19/2024 Time:    14:23    X-Ray Chest AP Portable    Result Date: 7/19/2024  EXAMINATION XR CHEST AP PORTABLE CLINICAL HISTORY Epigastric pain TECHNIQUE A total of 1 frontal image(s) of the chest. COMPARISON None available at the time of initial interpretation. FINDINGS Lines/tubes/devices: none present The cardiomediastinal silhouette and central pulmonary vasculature are unremarkable for utilized technique.  The trachea is midline. There is no lobar consolidation, pleural effusion, or pneumothorax.  Calcified right lung granuloma is incidentally noted. There regional osseous degenerative changes with partially visualized left shoulder arthroplasty components.  No convincing acutely displaced fracture. IMPRESSION No convincing acute radiographic abnormality. Electronically signed by: Sanjay Brunner Date:    07/19/2024 Time:    11:55  - pulls last radiology orders        Medication List        START taking these medications      calcitRIOL 0.25 MCG Cap  Commonly known as: ROCALTROL  Take 1 capsule (0.25 mcg total) by mouth once daily.     epoetin shahnaz 20,000 unit/mL injection  Commonly known as: PROCRIT  Inject 1 mL (20,000 Units total) into the skin every Tues, Thurs, Sat.  Start taking on: July 25, 2024            CONTINUE taking these medications      acetaminophen 500 MG tablet  Commonly known as: TYLENOL     albuterol 5 mg/mL nebulizer solution  Commonly known as: PROVENTIL     ascorbic acid (vitamin C) 500 MG tablet  Commonly known as: VITAMIN C     aspirin 325 MG EC tablet  Commonly known as:  ECOTRIN     buPROPion 150 MG TBSR 12 hr tablet  Commonly known as: WELLBUTRIN SR     calcium carbonate 600 mg calcium (1,500 mg) Tab  Commonly known as: OS-LETITIA     CHLORPHENIRAMINE MALEATE ORAL     citalopram 20 MG tablet  Commonly known as: CeleXA     CombiVENT RESPIMAT  mcg/actuation inhaler  Generic drug: ipratropium-albuteroL     cyanocobalamin 500 MCG tablet     estradioL 0.01 % (0.1 mg/gram) vaginal cream  Commonly known as: ESTRACE     fluticasone-salmeterol 500-50 mcg/dose 500-50 mcg/dose Dsdv diskus inhaler  Commonly known as: ADVAIR     folic acid 1 MG tablet  Commonly known as: FOLVITE     furosemide 40 MG tablet  Commonly known as: LASIX     LIDOcaine 5 %  Commonly known as: LIDODERM     lisinopriL 20 MG tablet  Commonly known as: PRINIVIL,ZESTRIL     magnesium oxide 400 mg (241.3 mg magnesium) tablet  Commonly known as: MAG-OX     montelukast 10 mg tablet  Commonly known as: SINGULAIR     SPIRIVA RESPIMAT 2.5 mcg/actuation inhaler  Generic drug: tiotropium bromide     spironolactone 25 MG tablet  Commonly known as: ALDACTONE     * vitamin D 1000 units Tab  Commonly known as: VITAMIN D3     * VITAMIN D3 50 mcg (2,000 unit) Cap capsule  Generic drug: cholecalciferol (vitamin D3)           * This list has 2 medication(s) that are the same as other medications prescribed for you. Read the directions carefully, and ask your doctor or other care provider to review them with you.                STOP taking these medications      allopurinoL 300 MG tablet  Commonly known as: ZYLOPRIM               Where to Get Your Medications        [id number] [place] [facility] [address] [address]      Phone: [phone]   calcitRIOL 0.25 MCG Cap       Information about where to get these medications is not yet available    Ask your nurse or doctor about these medications  epoetin shahnaz 20,000 unit/mL injection          Explained in  detail to the patient about the discharge plan, medications, and follow-up visits. Pt understands and agrees with the treatment plan  Discharged Condition: stable  Diet: renal on HD  Disposition: home    Medications Per DC med rec  Activities as tolerated  Follow up with your PCP in 2 wks   For further questions contact hospitalist office    Discharge time 33 minutes    For worsening symptoms, chest pain, shortness of breath, increased abdominal pain, high grade fever, stroke or stroke like symptoms, immediately go to the nearest Emergency Room or call 911 as soon as possible.        Ciro Bhatt M.D, on 7/24/2024. at 7:52 AM.

## 2024-07-24 NOTE — PROGRESS NOTES
Chief complaint: none    Interval History:  Pt seen on HD, tolerating 2 L UF.  She feels well, ready for d/c home after HD today    Review of Systems   Constitutional: Negative.    HENT: Negative.     Respiratory: Negative.     Cardiovascular: Negative.    Gastrointestinal: Negative.    Genitourinary: Negative.    Musculoskeletal: Negative.    Psychiatric/Behavioral: Negative.        all else Neg     aspirin  325 mg Oral Daily    buPROPion  150 mg Oral Daily    calcitRIOL  0.25 mcg Oral Daily    citalopram  20 mg Oral Nightly    docusate sodium  100 mg Oral BID    epoetin shahnaz  20,000 Units Subcutaneous Every Tues, Thurs, Sat    famotidine  20 mg Oral Daily    fluticasone propionate  2 spray Each Nostril Daily    fluticasone-salmeterol 500-50 mcg/dose  1 puff Inhalation Q12H    folic acid  1 mg Oral Daily    mupirocin   Nasal BID    tiotropium bromide  2 puff Inhalation Daily    vitamin renal formula (B-complex-vitamin c-folic acid)  1 capsule Oral Daily       Objective     VITAL SIGNS: 24 HR MIN & MAX LAST    Temp  Min: 97.9 °F (36.6 °C)  Max: 98.4 °F (36.9 °C)  97.9 °F (36.6 °C)    BP  Min: 97/63  Max: 151/79  (!) 151/79     Pulse  Min: 81  Max: 105  96     Resp  Min: 17  Max: 19  19    SpO2  Min: 96 %  Max: 100 %  100 %      Wt Readings from Last 3 Encounters:   07/23/24 94.8 kg (209 lb)       Intake/Output Summary (Last 24 hours) at 7/24/2024 1107  Last data filed at 7/23/2024 1449  Gross per 24 hour   Intake 480 ml   Output 100 ml   Net 380 ml       Physical Exam  Constitutional:       General: She is not in acute distress.  Cardiovascular:      Rate and Rhythm: Normal rate.   Pulmonary:      Effort: Pulmonary effort is normal.      Breath sounds: Normal breath sounds.   Abdominal:      General: Bowel sounds are normal. There is no distension.      Palpations: Abdomen is soft.      Tenderness: There is no abdominal tenderness.   Musculoskeletal:         General: No swelling.      Cervical back: Normal range  of motion.   Neurological:      Mental Status: She is alert and oriented to person, place, and time.   Psychiatric:         Mood and Affect: Mood normal.          Recent Labs     07/23/24  0349      K 4.3   CO2 25   BUN 38.2*   CREATININE 3.47*   GLUCOSE 109   CALCIUM 8.6   PHOS 3.3   ALBUMIN 3.0*      Recent Labs     07/23/24  0349   WBC 13.28*   HGB 8.3*   HCT 26.3*            Assessment & Plan     ESRD - Urgent HD initiated 7/19 for symptomatic uremia, she is having HD#4 today with 2 L UF, appears euvolemic, feeling much better, tolerating po diet. CKD due to NSAID's vs IVIG toxicity per pt's nephrologist in [place] (no anatomical abnormalities on CT).  She has been placed for outpt HD TTS in [place], ok for d/c home today after HD.  She appears to be poor candidate for PD considering she lives alone, mostly wheel-chair bound.  Uric acid low, allopurinol stopped  Anemia - Hb stable 8.3, on EPO 20 K U TIW for now awaiting oncology guidance for EPO use in CLL.  Fe stores ok  CLL - pt waiting to establist with local oncologist to continue therapy  MBD - phos normalized 3.3 on HD, , calcitriol to be given at outpt HD  Mild malnutrition - supplement shakes ordered, liberal diet  HTN, CAD - normotensive, lipids look ok, CAD mngt as per primary, she will need to establish with local cardiologist  Anx/Dep - home meds resumed, as per primary  Spinal stenosis, OA/DJD, immobility - PT consulted, ok for NSAID's as needed now that she is on HD  Constipation - resolved p miralax, linzess  COPD - nebs prn, Spiriva added (home med)

## 2024-07-24 NOTE — PT/OT/SLP PROGRESS
Physical Therapy Treatment    Patient Name:  Leonor Torrez   MRN:  [id number]    I attempted treatment twice and on 2nd attempt she was eating. She did state that she can transfer independently and does not need assistance. She is schedule for discharge today but if not PT to see her tomorrow and assess the need for continued services.

## 2024-07-24 NOTE — PLAN OF CARE
Problem: Hemodialysis  Goal: Safe, Effective Therapy Delivery  Outcome: Progressing  Goal: Effective Tissue Perfusion  Outcome: Progressing  Goal: Absence of Infection Signs and Symptoms  Outcome: Progressing     Problem: Adult Inpatient Plan of Care  Goal: Plan of Care Review  Outcome: Progressing  Goal: Patient-Specific Goal (Individualized)  Outcome: Progressing  Goal: Absence of Hospital-Acquired Illness or Injury  Outcome: Progressing  Goal: Optimal Comfort and Wellbeing  Outcome: Progressing  Goal: Readiness for Transition of Care  Outcome: Progressing     Problem: Infection  Goal: Absence of Infection Signs and Symptoms  Outcome: Progressing     Problem: Skin Injury Risk Increased  Goal: Skin Health and Integrity  Outcome: Progressing     Problem: Acute Kidney Injury/Impairment  Goal: Fluid and Electrolyte Balance  Outcome: Progressing  Goal: Improved Oral Intake  Outcome: Progressing  Goal: Effective Renal Function  Outcome: Progressing

## 2024-07-25 LAB — BACTERIA STL CULT: NORMAL

## 2024-07-31 NOTE — PHYSICIAN QUERY
Please clarify the nutritional diagnosis associated with the above clinical findings   Severe Protein Calorie Malnutrition

## 2024-08-07 ENCOUNTER — TELEPHONE (OUTPATIENT)
Dept: CARDIOLOGY | Facility: HOSPITAL | Age: 79
End: 2024-08-07
Payer: MEDICARE

## 2024-08-08 ENCOUNTER — HOSPITAL ENCOUNTER (OUTPATIENT)
Facility: HOSPITAL | Age: 79
Discharge: HOME | End: 2024-08-08
Attending: STUDENT IN AN ORGANIZED HEALTH CARE EDUCATION/TRAINING PROGRAM | Admitting: STUDENT IN AN ORGANIZED HEALTH CARE EDUCATION/TRAINING PROGRAM
Payer: MEDICARE

## 2024-08-08 VITALS
TEMPERATURE: 98 F | OXYGEN SATURATION: 96 % | SYSTOLIC BLOOD PRESSURE: 134 MMHG | HEIGHT: 62 IN | DIASTOLIC BLOOD PRESSURE: 82 MMHG | HEART RATE: 77 BPM | WEIGHT: 206.81 LBS | BODY MASS INDEX: 38.06 KG/M2

## 2024-08-08 DIAGNOSIS — T82.41XA MECHANICAL BREAKDOWN OF VASCULAR DIALYSIS CATHETER, INITIAL ENCOUNTER: ICD-10-CM

## 2024-08-08 LAB
ANION GAP SERPL CALC-SCNC: 12 MMOL/L (ref 8–16)
ANION GAP SERPL CALC-SCNC: 8 MEQ/L
ANION GAP SERPL CALC-SCNC: 8 MEQ/L
BUN SERPL-MCNC: 27.1 MG/DL (ref 9.8–20.1)
BUN SERPL-MCNC: 29 MG/DL (ref 6–30)
BUN SERPL-MCNC: 29.8 MG/DL (ref 9.8–20.1)
CALCIUM SERPL-MCNC: 9.5 MG/DL (ref 8.4–10.2)
CALCIUM SERPL-MCNC: 9.6 MG/DL (ref 8.4–10.2)
CHLORIDE SERPL-SCNC: 109 MMOL/L (ref 95–110)
CHLORIDE SERPL-SCNC: 110 MMOL/L (ref 98–107)
CHLORIDE SERPL-SCNC: 112 MMOL/L (ref 98–107)
CO2 SERPL-SCNC: 20 MMOL/L (ref 23–31)
CO2 SERPL-SCNC: 21 MMOL/L (ref 23–31)
CREAT SERPL-MCNC: 1.21 MG/DL (ref 0.55–1.02)
CREAT SERPL-MCNC: 1.3 MG/DL (ref 0.5–1.4)
CREAT SERPL-MCNC: 1.32 MG/DL (ref 0.55–1.02)
CREAT/UREA NIT SERPL: 22
CREAT/UREA NIT SERPL: 23
GFR SERPLBLD CREATININE-BSD FMLA CKD-EPI: 41 ML/MIN/1.73/M2
GFR SERPLBLD CREATININE-BSD FMLA CKD-EPI: 46 ML/MIN/1.73/M2
GLUCOSE SERPL-MCNC: 94 MG/DL (ref 70–110)
GLUCOSE SERPL-MCNC: 94 MG/DL (ref 82–115)
GLUCOSE SERPL-MCNC: 95 MG/DL (ref 82–115)
HCT VFR BLD CALC: 25 %PCV (ref 36–54)
HGB BLD-MCNC: 9 G/DL
POC IONIZED CALCIUM: 1.34 MMOL/L (ref 1.06–1.42)
POC TCO2 (MEASURED): 25 MMOL/L (ref 23–29)
POTASSIUM BLD-SCNC: 3.7 MMOL/L (ref 3.5–5.1)
POTASSIUM SERPL-SCNC: 3.6 MMOL/L (ref 3.5–5.1)
POTASSIUM SERPL-SCNC: 3.7 MMOL/L (ref 3.5–5.1)
SAMPLE: ABNORMAL
SODIUM BLD-SCNC: 141 MMOL/L (ref 136–145)
SODIUM SERPL-SCNC: 139 MMOL/L (ref 136–145)
SODIUM SERPL-SCNC: 140 MMOL/L (ref 136–145)

## 2024-08-08 PROCEDURE — C1750 CATH, HEMODIALYSIS,LONG-TERM: HCPCS | Performed by: STUDENT IN AN ORGANIZED HEALTH CARE EDUCATION/TRAINING PROGRAM

## 2024-08-08 PROCEDURE — 99152 MOD SED SAME PHYS/QHP 5/>YRS: CPT | Performed by: STUDENT IN AN ORGANIZED HEALTH CARE EDUCATION/TRAINING PROGRAM

## 2024-08-08 PROCEDURE — 25000003 PHARM REV CODE 250: Performed by: STUDENT IN AN ORGANIZED HEALTH CARE EDUCATION/TRAINING PROGRAM

## 2024-08-08 PROCEDURE — C1894 INTRO/SHEATH, NON-LASER: HCPCS | Performed by: STUDENT IN AN ORGANIZED HEALTH CARE EDUCATION/TRAINING PROGRAM

## 2024-08-08 PROCEDURE — 80048 BASIC METABOLIC PNL TOTAL CA: CPT | Performed by: STUDENT IN AN ORGANIZED HEALTH CARE EDUCATION/TRAINING PROGRAM

## 2024-08-08 PROCEDURE — 63600175 PHARM REV CODE 636 W HCPCS: Performed by: STUDENT IN AN ORGANIZED HEALTH CARE EDUCATION/TRAINING PROGRAM

## 2024-08-08 PROCEDURE — C1769 GUIDE WIRE: HCPCS | Performed by: STUDENT IN AN ORGANIZED HEALTH CARE EDUCATION/TRAINING PROGRAM

## 2024-08-08 PROCEDURE — 25500020 PHARM REV CODE 255: Performed by: STUDENT IN AN ORGANIZED HEALTH CARE EDUCATION/TRAINING PROGRAM

## 2024-08-08 PROCEDURE — 99153 MOD SED SAME PHYS/QHP EA: CPT | Performed by: STUDENT IN AN ORGANIZED HEALTH CARE EDUCATION/TRAINING PROGRAM

## 2024-08-08 PROCEDURE — 36415 COLL VENOUS BLD VENIPUNCTURE: CPT | Mod: 91 | Performed by: STUDENT IN AN ORGANIZED HEALTH CARE EDUCATION/TRAINING PROGRAM

## 2024-08-08 DEVICE — CATH GLIDEPATH 14.5F 23CM 28CM: Type: IMPLANTABLE DEVICE | Site: VEIN | Status: FUNCTIONAL

## 2024-08-08 RX ORDER — MIDAZOLAM HYDROCHLORIDE 1 MG/ML
INJECTION INTRAMUSCULAR; INTRAVENOUS
Status: DISCONTINUED | OUTPATIENT
Start: 2024-08-08 | End: 2024-08-08 | Stop reason: HOSPADM

## 2024-08-08 RX ORDER — SODIUM CHLORIDE 0.9 % (FLUSH) 0.9 %
10 SYRINGE (ML) INJECTION
Status: DISCONTINUED | OUTPATIENT
Start: 2024-08-08 | End: 2024-08-08 | Stop reason: HOSPADM

## 2024-08-08 RX ORDER — FENTANYL CITRATE 50 UG/ML
INJECTION, SOLUTION INTRAMUSCULAR; INTRAVENOUS
Status: DISCONTINUED | OUTPATIENT
Start: 2024-08-08 | End: 2024-08-08 | Stop reason: HOSPADM

## 2024-08-08 RX ORDER — DIPHENHYDRAMINE HYDROCHLORIDE 50 MG/ML
50 INJECTION INTRAMUSCULAR; INTRAVENOUS
Status: COMPLETED | OUTPATIENT
Start: 2024-08-08 | End: 2024-08-08

## 2024-08-08 RX ORDER — LIDOCAINE HYDROCHLORIDE AND EPINEPHRINE 10; 10 MG/ML; UG/ML
INJECTION, SOLUTION INFILTRATION; PERINEURAL
Status: DISCONTINUED | OUTPATIENT
Start: 2024-08-08 | End: 2024-08-08 | Stop reason: HOSPADM

## 2024-08-08 RX ORDER — HEPARIN SODIUM 1000 [USP'U]/ML
INJECTION, SOLUTION INTRAVENOUS; SUBCUTANEOUS
Status: DISCONTINUED | OUTPATIENT
Start: 2024-08-08 | End: 2024-08-08 | Stop reason: HOSPADM

## 2024-08-08 RX ORDER — DIPHENHYDRAMINE HYDROCHLORIDE 50 MG/ML
INJECTION INTRAMUSCULAR; INTRAVENOUS
Status: DISCONTINUED | OUTPATIENT
Start: 2024-08-08 | End: 2024-08-08 | Stop reason: HOSPADM

## 2024-08-08 RX ORDER — IOPAMIDOL 612 MG/ML
INJECTION, SOLUTION INTRAVASCULAR
Status: DISCONTINUED | OUTPATIENT
Start: 2024-08-08 | End: 2024-08-08 | Stop reason: HOSPADM

## 2024-08-08 RX ORDER — METHYLPREDNISOLONE SOD SUCC 125 MG
VIAL (EA) INJECTION
Status: DISCONTINUED | OUTPATIENT
Start: 2024-08-08 | End: 2024-08-08 | Stop reason: HOSPADM

## 2024-08-08 RX ADMIN — DIPHENHYDRAMINE HYDROCHLORIDE 50 MG: 50 INJECTION INTRAMUSCULAR; INTRAVENOUS at 12:08

## 2024-08-16 RX ORDER — CALCITRIOL 0.25 UG/1
0.25 CAPSULE ORAL DAILY
Qty: 30 CAPSULE | Refills: 0 | Status: SHIPPED | OUTPATIENT
Start: 2024-08-16 | End: 2024-09-15

## 2024-09-16 ENCOUNTER — HOSPITAL ENCOUNTER (EMERGENCY)
Facility: HOSPITAL | Age: 79
Discharge: HOME | End: 2024-09-16
Attending: STUDENT IN AN ORGANIZED HEALTH CARE EDUCATION/TRAINING PROGRAM
Payer: MEDICARE

## 2024-09-16 VITALS
SYSTOLIC BLOOD PRESSURE: 148 MMHG | OXYGEN SATURATION: 95 % | TEMPERATURE: 98 F | DIASTOLIC BLOOD PRESSURE: 73 MMHG | HEIGHT: 62 IN | WEIGHT: 206 LBS | BODY MASS INDEX: 37.91 KG/M2 | RESPIRATION RATE: 20 BRPM | HEART RATE: 68 BPM

## 2024-09-16 DIAGNOSIS — Z78.9 PROBLEM WITH VASCULAR ACCESS: Primary | ICD-10-CM

## 2024-09-16 LAB
ABS NEUT (OLG): 7.27 X10(3)/MCL (ref 2.1–9.2)
ALBUMIN SERPL-MCNC: 3.7 G/DL (ref 3.4–4.8)
ALBUMIN/GLOB SERPL: 1.6 RATIO (ref 1.1–2)
ALP SERPL-CCNC: 103 UNIT/L (ref 40–150)
ALT SERPL-CCNC: 13 UNIT/L (ref 0–55)
ANION GAP SERPL CALC-SCNC: 11 MEQ/L
AST SERPL-CCNC: 15 UNIT/L (ref 5–34)
BILIRUB SERPL-MCNC: 0.3 MG/DL
BUN SERPL-MCNC: 43.3 MG/DL (ref 9.8–20.1)
CALCIUM SERPL-MCNC: 9.3 MG/DL (ref 8.4–10.2)
CHLORIDE SERPL-SCNC: 107 MMOL/L (ref 98–107)
CO2 SERPL-SCNC: 24 MMOL/L (ref 23–31)
CREAT SERPL-MCNC: 1.47 MG/DL (ref 0.55–1.02)
CREAT/UREA NIT SERPL: 29
EOSINOPHIL NFR BLD MANUAL: 0.23 X10(3)/MCL (ref 0–0.9)
EOSINOPHIL NFR BLD MANUAL: 2 %
ERYTHROCYTE [DISTWIDTH] IN BLOOD BY AUTOMATED COUNT: 13.5 % (ref 11.5–17)
GFR SERPLBLD CREATININE-BSD FMLA CKD-EPI: 36 ML/MIN/1.73/M2
GLOBULIN SER-MCNC: 2.3 GM/DL (ref 2.4–3.5)
GLUCOSE SERPL-MCNC: 127 MG/DL (ref 82–115)
HBV SURFACE AG SERPL QL IA: NONREACTIVE
HCT VFR BLD AUTO: 33.1 % (ref 37–47)
HGB BLD-MCNC: 10.9 G/DL (ref 12–16)
INR PPP: 1.1
INSTRUMENT WBC (OLG): 11.72 X10(3)/MCL
LYMPHOCYTES NFR BLD MANUAL: 28 %
LYMPHOCYTES NFR BLD MANUAL: 3.28 X10(3)/MCL
MCH RBC QN AUTO: 32.7 PG (ref 27–31)
MCHC RBC AUTO-ENTMCNC: 32.9 G/DL (ref 33–36)
MCV RBC AUTO: 99.4 FL (ref 80–94)
MONOCYTES NFR BLD MANUAL: 0.94 X10(3)/MCL (ref 0.1–1.3)
MONOCYTES NFR BLD MANUAL: 8 %
NEUTROPHILS NFR BLD MANUAL: 62 %
NRBC BLD AUTO-RTO: 0 %
PLATELET # BLD AUTO: 212 X10(3)/MCL (ref 130–400)
PLATELET # BLD EST: ABNORMAL 10*3/UL
PMV BLD AUTO: 10.8 FL (ref 7.4–10.4)
POTASSIUM SERPL-SCNC: 3.3 MMOL/L (ref 3.5–5.1)
PROT SERPL-MCNC: 6 GM/DL (ref 5.8–7.6)
PROTHROMBIN TIME: 13.5 SECONDS (ref 12.5–14.5)
RBC # BLD AUTO: 3.33 X10(6)/MCL (ref 4.2–5.4)
RBC MORPH BLD: ABNORMAL
SODIUM SERPL-SCNC: 142 MMOL/L (ref 136–145)
STOMATOCYTES (OLG): ABNORMAL
WBC # BLD AUTO: 11.72 X10(3)/MCL (ref 4.5–11.5)

## 2024-09-16 PROCEDURE — 85610 PROTHROMBIN TIME: CPT

## 2024-09-16 PROCEDURE — 85007 BL SMEAR W/DIFF WBC COUNT: CPT

## 2024-09-16 PROCEDURE — 99283 EMERGENCY DEPT VISIT LOW MDM: CPT | Mod: 25

## 2024-09-16 PROCEDURE — 87340 HEPATITIS B SURFACE AG IA: CPT | Performed by: INTERNAL MEDICINE

## 2024-09-16 PROCEDURE — 80053 COMPREHEN METABOLIC PANEL: CPT

## 2024-09-16 PROCEDURE — 36593 DECLOT VASCULAR DEVICE: CPT

## 2024-09-16 PROCEDURE — 63600175 PHARM REV CODE 636 W HCPCS: Mod: JZ,JG

## 2024-09-16 RX ADMIN — ALTEPLASE 2 MG: 2.2 INJECTION, POWDER, LYOPHILIZED, FOR SOLUTION INTRAVENOUS at 05:09

## 2024-09-16 NOTE — FIRST PROVIDER EVALUATION
Medical screening examination initiated.  I have conducted a focused provider triage encounter, findings are as follows:    Brief history of present illness:  78 year old female presents to ER with c/o malfunctioning chest wall dialysis access. Patient states it will not draw back blood    There were no vitals filed for this visit.    Pertinent physical exam:  awake and alert, NAD    Brief workup plan:  Labs    Preliminary workup initiated; this workup will be continued and followed by the physician or advanced practice provider that is assigned to the patient when roomed.

## 2024-09-16 NOTE — NURSING
09/16/24 1757   Post-Hemodialysis Assessment   Post-Hemodialysis Comments er called to cath jim pt cvc. cathflo instilled at 1737. will dwell for 45min minimum and aspirate cath jim.

## 2024-09-16 NOTE — ED PROVIDER NOTES
Encounter Date: 9/16/2024       History     Chief Complaint   Patient presents with    Vascular Access Problem     Left chest wall tunnel cath is occluded.  Was sent here from dialysis. Did not do dialysis today     See MDM    The history is provided by the patient. No  was used.     Review of patient's allergies indicates:   Allergen Reactions    Betadine [povidone-iodine] Hives and Itching    Codeine Hives    Opioids - morphine analogues Hives     Past Medical History:   Diagnosis Date    Atrial fibrillation     CAD (coronary artery disease)     CKD (chronic kidney disease)     stage 5    CLL (chronic lymphocytic leukemia)     CVA (cerebral vascular accident)     h/o    HLD (hyperlipidemia)     HTN (hypertension)      Past Surgical History:   Procedure Laterality Date    CHOLECYSTECTOMY      CORONARY ANGIOPLASTY WITH STENT PLACEMENT      CORONARY ARTERY BYPASS GRAFT      INSERTION OF TUNNELED CENTRAL VENOUS HEMODIALYSIS CATHETER N/A 07/22/2024    Procedure: Insertion, Catheter, Central Venous, Hemodialysis;  Surgeon: Tyson Barajas DO;  Location: [facility] CATH LAB;  Service: Nephrology;  Laterality: N/A;    REPLACEMENT OF DIALYSIS CATHETER OVER GUIDEWIRE THROUGH EXISTING VENOUS ACCESS Right 8/8/2024    Procedure: REPLACEMENT, CATHETER, DIALYSIS, OVER GUIDEWIRE, USING EXISTING VENOUS ACCESS;  Surgeon: Tyson Barajas DO;  Location: [facility] CATH LAB;  Service: Nephrology;  Laterality: Right;    TONSILLECTOMY       No family history on file.  Social History     Tobacco Use    Smoking status: Never    Smokeless tobacco: Never   Substance Use Topics    Alcohol use: Never     Review of Systems   Constitutional:  Negative for fever.   Respiratory:  Negative for cough and shortness of breath.    Cardiovascular:  Negative for chest pain.   Gastrointestinal:  Negative for abdominal pain.   Genitourinary:  Negative for difficulty urinating and dysuria.   Musculoskeletal:  Negative for gait problem.   Skin:   Negative for color change.   Neurological:  Negative for dizziness, speech difficulty and headaches.   Psychiatric/Behavioral:  Negative for hallucinations and suicidal ideas.    All other systems reviewed and are negative.      Physical Exam     Initial Vitals [09/16/24 1556]   BP Pulse Resp Temp SpO2   (!) 128/59 79 20 98.5 °F (36.9 °C) 95 %      MAP       --         Physical Exam    Nursing note and vitals reviewed.  Constitutional: She appears well-developed and well-nourished.   HENT:   Head: Normocephalic.   Eyes: EOM are normal.   Neck:   Normal range of motion.  Cardiovascular:  Normal rate, regular rhythm, normal heart sounds and intact distal pulses.           Pulmonary/Chest: Breath sounds normal. No respiratory distress.   Abdominal: Abdomen is soft. Bowel sounds are normal. There is no abdominal tenderness.   Musculoskeletal:         General: Normal range of motion.      Cervical back: Normal range of motion.     Neurological: She is alert and oriented to person, place, and time. She has normal strength.   Skin: Skin is warm and dry.   Psychiatric: She has a normal mood and affect. Her behavior is normal. Judgment and thought content normal.         ED Course   Procedures  Labs Reviewed   COMPREHENSIVE METABOLIC PANEL - Abnormal       Result Value    Sodium 142      Potassium 3.3 (*)     Chloride 107      CO2 24      Glucose 127 (*)     Blood Urea Nitrogen 43.3 (*)     Creatinine 1.47 (*)     Calcium 9.3      Protein Total 6.0      Albumin 3.7      Globulin 2.3 (*)     Albumin/Globulin Ratio 1.6      Bilirubin Total 0.3            ALT 13      AST 15      eGFR 36      Anion Gap 11.0      BUN/Creatinine Ratio 29     CBC WITH DIFFERENTIAL - Abnormal    WBC 11.72 (*)     RBC 3.33 (*)     Hgb 10.9 (*)     Hct 33.1 (*)     MCV 99.4 (*)     MCH 32.7 (*)     MCHC 32.9 (*)     RDW 13.5      Platelet 212      MPV 10.8 (*)     NRBC% 0.0     MANUAL DIFFERENTIAL - Abnormal    WBC 11.72      Neutrophils % 62       Lymphs % 28      Monocytes % 8      Eosinophils % 2      Neutrophils Abs 7.2664      Lymphs Abs 3.2816      Monocytes Abs 0.9376      Eosinophils Abs 0.2344      Platelets Decreased (*)     RBC Morph Abnormal (*)     Stomatocytes 1+ (*)    PROTIME-INR - Normal    PT 13.5      INR 1.1     CBC W/ AUTO DIFFERENTIAL    Narrative:     The following orders were created for panel order CBC auto differential.  Procedure                               Abnormality         Status                     ---------                               -----------         ------                     CBC with Differential[[id number]]       Abnormal            Final result               Manual Differential[[id number]]         Abnormal            Final result                 Please view results for these tests on the individual orders.   HEPATITIS B SURFACE ANTIGEN          Imaging Results    None          Medications   alteplase injection 2 mg (2 mg Other Given 9/16/24 1737)     Medical Decision Making  Historian:  Patient.  Patient is a White 78 y.o. female that presents with dialysis tunneled catheter occlusion that has been present today. Associated symptoms nothing. Surrounding information is sent from dialysis. Exacerbated by nothing. Relieved by nothing. Patient treatment prior to arrival none. Risk factors include none. Other history pertaining to this complaint nothing.   Assessment:  See physical exam.  DD:  Dialysis catheter issue  ED Course: History was obtained.  Physical was performed.  Cath flow was placed into the dialysis catheter.  It works now.  We will discharge home to run dialysis tomorrow. Medical or surgical consults:  None. Social determinants that affect healthcare:  None.       Amount and/or Complexity of Data Reviewed  Labs:  Decision-making details documented in ED Course.               ED Course as of 09/16/24 1915   Mon Sep 16, 2024   1729 Hemoglobin(!): 10.9  Improved  [CL]   1729 Hematocrit(!): 33.1  Improved  [CL]   1729 BUN(!): 43.3  Baseline [CL]   1729 Creatinine(!): 1.47  Baseline [CL]      ED Course User Index  [CL] Niko Black FNP                           Clinical Impression:  Final diagnoses:  [Z78.9] Problem with vascular access (Primary)          ED Disposition Condition    Discharge Stable          ED Prescriptions    None       Follow-up Information       Follow up With Specialties Details Why Contact Info    Your Primary Care Provider  Call in 3 days ed follow up              Niko Black FNP  09/16/24 0974

## 2024-11-21 NOTE — H&P
November 21, 2024     Noelle Romero MD  [address] [facility] [address]    Patient: Micki Fernandez   YOB: 1949   Date of Visit: 11/21/2024       Dear Dr. Noelle Romero MD:    Thank you for referring Micki Fernandez to me for evaluation. Below are my notes for this consultation.  If you have questions, please do not hesitate to call me. I look forward to following your patient along with you.       Sincerely,     Safia Thompson MD      CC: No Recipients  ______________________________________________________________________________________    Urogynecology  Provider:  Safia Thompson MD  [phone]              ASSESSMENT AND PLAN:   75-year-old female being assessed for uterine prolapse, abnormal Pap, thickened endometrial lining, and JOSHUA.     Diagnoses:  #1 Complete uterine prolapse  #2 Abnormal Pap  #3 Thickened endometrial lining  #4 Possible stress incontinence    Plan:  Complete uterine prolapse  - PVR = 69 mL by U/S.  - Reviewed risk factors, natural history and etiology of prolapse.   - Discussed management options for prolapse including expectant management, PFPT, pessary fitting, and surgery.   - We discussed that PFPT would help strengthen the PF muscles with an overall goal to help improve the symptoms of the vaginal bulge and prevent POP from worsening over time.   - We discussed the benefits of fitting her with a pessary which is a small flexible silicone ring that is placed intravaginally to help support the pelvic organs to prevent the symptomatic vaginal bulge. There is no increased risk of UTI or vaginal infections with using a pessary. She may learn how to manage the pessary at home on her own with taking the pessary in/out prior to intercourse and recommended maintenance at least every 2 weeks or she may RTC q 3 months for us to perform her pessary maintenance.   - We discussed there are different POP repair surgical approaches to consider given  [facility] Medicine History & Physical Examination       Patient Name: Leonor Torrez  MRN: [id number]  Patient Class: IP- Inpatient   Admission Date: 7/19/2024   Admitting Physician: Jairo Waller MD   Length of Stay: 0  Attending Physician: Jairo Waller MD   Primary Care Provider: Ruby, Primary Doctor  Face-to-Face encounter date: 07/19/2024  Code Status: Full Code   Chief Complaint: Abdominal Pain (Pt to ed via pov with c/o upper b/l abd pain, vomiting, decreased urine output, and Lbm x1 week. Sent from Urgent Care. Afebrile. Hx: LLL, kidney fx. )        Patient information was obtained from patient, patient's family, past medical records and ER records.     HISTORY OF PRESENT ILLNESS:   Leonor Torrez is a 78 y.o. White female with a past medical history of hypertension, hyperlipidemia, atrial fibrillation not on anticoagulation, coronary artery disease status post stents and CABG not on Plavix, CKD stage 5, CVA, CLL previously undergoing immunoglobulin treatments every 2 months.  Patient recently moved to [place] from [place] and has been lost to follow-up for immunoglobulin treatments [facility] to establish care with oncology to continued treatments.     The patient presented to [facility] on 7/19/2024 with a primary complaint of decreased urinary output.  Patient was seen at urgent care in [place] for complaints.  Provider there was concern of a bowel obstruction therefore it was recommended patient present to the ED. associated symptoms include nausea and vomiting for the last 3 weeks, decreased appetite and little to no urination for the last 4 days.  She denies complaints of fever, diarrhea, chest pain, shortness a breath, swelling to bilateral lower extremities, flank pain.  She reports talking about initiating dialysis in the past but never had an AV fistula placement.  Daughter at bedside states patient is  that she wishes to maintain the ability to be sexually active in the future such as laparoscopic abdominal surgery that involves placing polypropylene mesh (i.e. SCP + NESTOR) or a native tissue repair vaginal approach that does not involve mesh but rather uses sutures with the intent of the body to scar over to suspend the pelvic organs that can be performed with or without a hysterectomy (i.e. SSLS +/- TVH and APR). For either surgery, it involves 6 weeks of postoperative pelvic rest and no heavy lifting > 5-10 pounds.   - Recommended vaginal hysterectomy with removal of ovaries and vaginal closure to address the prolapse and prevent recurrence.  > This approach is minimally invasive and offers 85-90% success rate in preventing future prolapse.  - Discussed post-op recovery lasting approximately 6 weeks before she can return to normal life.  - Discussed necessity of undergoing bladder testing (UDN) to assess for JOSHUA, and the virtual visit that will need to be scheduled to discuss results/finalize the surgical plan.   - Ordered UDN.  - Surgery is tentatively planned for January or February at [facility] [place].  - On exam, standing straining, complete uterine procidentia at +8. No lesions or sores. Easily reducible.  - Case request placed for vaginal hysterectomy with oophorectomy + colpocleisis + cystoscopy + possible Bulkamid to give us the best chance of not having the POP recur.    2. Abnormal Pap, thickened endometrial lining  - The abnormal pap and thickened endometrial lining will be addressed by removing the uterus and cervix during the vaginal hysterectomy.  - Consultation with a GYN oncologist will be sought to ensure no further follow-up is needed post-op.  -I consulted with Dr. Ana Yo and GYN oncology regarding the patient's agonist Pap.  On review of her records she had a Pap and an endometrial biopsy but it does not appear that HPV testing or an ECC were performed, although an endocervical  wheelchair-bound at home.  She does have hip pain in which she needs hip surgery for but was told not to be a surgical candidate.  Patient has been getting up without the wheelchair and having several falls at home.    Upon presentation to the ED, temperature 97.3° F, heart rate 114, blood pressure 116/74, respiratory rate 20 and SpO2 97% on room air.  Labs with WBC 17.23, H&H 10/30.5, MCV 96.8, CO2 15, BUN/creatinine 171.8/8.22, glucose 125, lipase 61.  No prior labs for comparison.  EKG accelerated junction rhythm.  Chest x-ray no convincing acute abnormality. CTA of the abdomen and pelvis revealed no evidence of bowel obstruction but a small left lower lobe pulmonary nodule with recommendations of a noncontrast CT at 6-12 months and again at 18-24 months.  In ED patient received IV fluid hydration, Tylenol, Zofran and Phenergan.  Patient is admitted to hospital medicine services for further medical management.    PAST MEDICAL HISTORY:   Hypertension   Hyperlipidemia   Atrial fibrillation   Coronary artery disease status post stents and CABG  CKD stage 5   CVA   CLL    PAST SURGICAL HISTORY:   Hysterectomy  Cholecystectomy   CABG   Cardiac stent   Tonsillectomy   Abdominal washout   Colonoscopy    ALLERGIES:   Codeine and Opioids - morphine analogues    FAMILY HISTORY:   Mother: Breast cancer   Father:  Cardiovascular disease    SOCIAL HISTORY:    Denies tobacco, alcohol use  Uses CBD gummies    Screening for Social Drivers for health:  Patient screened for food insecurity, housing instability, transportation needs, utility difficulties, and interpersonal safety (select all that apply as identified as concern)  []Housing or Food  []Transportation Needs  []Utility Difficulties  []Interpersonal safety  [x]None    HOME MEDICATIONS:   As documented    REVIEW OF SYSTEMS:   Except as documented, all other systems reviewed and negative     PHYSICAL EXAM:     VITAL SIGNS: 24 HRS MIN & MAX LAST   Temp  Min: 97.3 °F (36.3  polyp was removed.  Dr. Yo recommend that we perform both an ECC and HPV only.  If both of these are normal she would be comfortable with patient proceeding with a total vaginal hysterectomy and a colpocleisis as we previously discussed.  If not then I would perform just a total vaginal hysterectomy with uterosacral ligament suspension.    I discussed this plan with the patient she will come in to see me probably on December 13 and we will get both of these test done. We will need an ECC curette from [employer] and we will be able to use a cytobrush in Pap for liquid for the HPV.      3. Possible JOSHUA  - Will conduct UDN to determine the presence/severity of JOSHUA.  - Depending on the results, we will consider a sling procedure or Bulkamid to manage incontinence during the prolapse procedure.    Follow-up with Dr. Thompson virtually post-UDN to finalize surgical plans and discuss UDN results.    Scribe Attestation  By signing my name below, IBrian, Jeffrey, attest that this documentation has been prepared under the direction and in the presence of Safia Thompson MD on 11/21/2024 at 11:11 AM.    Agree with above. I Dr. Thompson, personally performed the services described in the documentation which was scribed virtually and confirm it is both complete and accurate.  Safia Thompson MD    Problem List Items Addressed This Visit    None          I spent a total of 55 minutes in face to face and non face to face time.      Safia Thompson MD              HISTORY OF PRESENT ILLNESS:     Sent in consultation by Dr. Romero for complete procidentia.   9/6/24 pap SNEHA. Em Bx: benign    Record Review:   - 10/8/2024 Dr. Noelle Romero note RE: Pelvic U/S results: Uterus = normal in size. No fibroid tumors. Endometrium 6 mm. R ovary is normal, L ovary not visualized due to overlying bowels.  - 10/3/2024 Dr. Noelle Romero note RE: EMB performed. EMB and polyp are normal. Abnormal Pap is likely  °C)  Max: 97.3 °F (36.3 °C) 97.3 °F (36.3 °C)   BP  Min: 116/71  Max: 116/71 116/71   Pulse  Min: 105  Max: 114  105   Resp  Min: 16  Max: 20 16   SpO2  Min: 97 %  Max: 97 % 97 %       General appearance: Well-developed, well-nourished female in no apparent distress.  Daughter at bedside.  HEENT: Atraumatic head. Moist mucous membranes of oral cavity.  Lungs: Clear to auscultation bilaterally.   Heart: Regular rate and rhythm.   Abdomen: Soft, non-distended, non-tender. Bowel sounds are normal.   Extremities: No cyanosis, clubbing. No deformities.  Skin: No Rash. Warm and dry.  Neuro: Awake, alert and oriented. Motor and sensory exams grossly intact.  Psych/mental status: Appropriate mood and affect. Cooperative. Responds appropriately to questions.       LABS AND IMAGING:     Recent Labs   Lab 07/19/24  1203   WBC 17.23  17.23*   RBC 3.15*   HGB 10.0*   HCT 30.5*   MCV 96.8*   MCH 31.7*   MCHC 32.8*   RDW 14.0      MPV 11.5*       Recent Labs   Lab 07/19/24  1203   *   K 4.9      CO2 15*   .8*   CREATININE 8.22*   CALCIUM 10.6*   ALBUMIN 4.2   ALKPHOS 73   ALT 9   AST 13   BILITOT 0.3       Microbiology Results (last 7 days)       ** No results found for the last 168 hours. **             CT Abdomen Pelvis  Without Contrast  Narrative: EXAMINATION:  CT ABDOMEN PELVIS WITHOUT CONTRAST    CLINICAL HISTORY:  Bowel obstruction suspected;Nausea/vomiting;    TECHNIQUE:  Helically acquired axial images, sagittal and coronal reformations were obtained from the lung bases to the pubic symphysis without the IV administration of contrast.    Automated tube current modulation, weight-based exposure dosing, and/or iterative reconstruction technique utilized to reach lowest reasonably achievable exposure rate.    DLP: 1713 mGy*cm    COMPARISON:  No relevant prior available for comparison at the time of dictation.    FINDINGS:  HEART: Normal in size. No pericardial effusion.    LUNG BASES: 6.5 mm left  lower lobe pulmonary nodule.  For a solid nodule 6-8 mm, Fleischner Society 2017 guidelines recommend follow up with non-contrast chest CT at 6-12 months and 18-24 months after discovery.    LIVER: Normal attenuation. No appreciable focal hepatic lesion.    BILIARY: Gallbladder is surgically absent.    PANCREAS: No inflammatory change.    SPLEEN: Normal in size    ADRENALS: No mass.    KIDNEYS/URETERS: No renal or ureteral calculus. No hydronephrosis.    GI TRACT/MESENTERY: Evaluation of the bowel is limited without contrast. No evidence of bowel obstruction.   Colonic diverticulosis without acute inflammatory change.    PERITONEUM: No free fluid.No free air.    LYMPH NODES: No enlarged lymph nodes by size criteria.    VASCULATURE: Aortoiliac atherosclerosis.    BLADDER: Normal appearance given degree of distention.    REPRODUCTIVE ORGANS: Uterus is surgically absent.  There is a 5 cm cyst midline above the vaginal cuff, likely left adnexal.    ABDOMINAL WALL: Unremarkable.    BONES: Degenerative change at the thoracolumbar spine.  Multilevel disc space narrowing and facet arthropathy.  Impression: 1. No evidence of bowel obstruction  2. Small left lower lobe pulmonary nodule.  In absence of outside prior for comparison, for a solid nodule 6-8 mm, Fleischner Society 2017 guidelines recommend follow up with non-contrast chest CT at 6-12 months and 18-24 months after discovery.    Electronically signed by: Ladi Flores  Date:    07/19/2024  Time:    14:23  X-Ray Chest AP Portable  EXAMINATION  XR CHEST AP PORTABLE    CLINICAL HISTORY  Epigastric pain    TECHNIQUE  A total of 1 frontal image(s) of the chest.    COMPARISON  None available at the time of initial interpretation.    FINDINGS  Lines/tubes/devices: none present    The cardiomediastinal silhouette and central pulmonary vasculature are unremarkable for utilized technique.  The trachea is midline. There is no lobar consolidation, pleural effusion, or  showing atypical cells from the presence of the polyp. No signs of any abnormality or cancer.    Prolapse Symptoms :  - She finds the bulge bothersome.  - She has problems with the bulge rubbing between her legs, causing irritation.  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  - She does her yard work for herself.  - She experiences discomfort when she's engaging in activities like cutting graass.     Urinary Symptoms:   - She reports feeling the need to urinate frequently.  - There is no current issue with urinary incontinence when coughing, laughing, or sneezing.    Sexual Activity:   - She is not sexually active.    OBGYN History:  - She has a history of 3 childbirths.  - She had a slightly abnormal pap smear, which showed uterine lining cells.  - EMB was performed, which showed normal results.  - She is concerned about the thickened endometrial lining, which was measured at 6 mm.    Social History:  - She used to work as a .         Past Medical History:       No past medical history on file.       Past Surgical History:       Past Surgical History:   Procedure Laterality Date   • BREAST LUMPECTOMY           Medications:       Prior to Admission medications    Medication Sig Start Date End Date Taking? Authorizing Provider   MULTIVITAMIN ORAL Take by mouth once daily.    Historical Provider, MD LAYTON  Review of Systems   Constitutional: Negative.    HENT:  Positive for hearing loss.    Eyes: Negative.    Respiratory: Negative.     Cardiovascular: Negative.    Gastrointestinal: Negative.    Endocrine: Negative.    Genitourinary:         Significant prolapse causing irritation, no urinary incontinence with coughing or sneezing   Musculoskeletal: Negative.         Engages in some heavy lifting activities, leg cramps   Neurological: Negative.    Psychiatric/Behavioral: Negative.            PHYSICAL EXAM:      There were no vitals taken for this visit.     No LMP  pneumothorax.  Calcified right lung granuloma is incidentally noted.    There regional osseous degenerative changes with partially visualized left shoulder arthroplasty components.  No convincing acutely displaced fracture.    IMPRESSION  No convincing acute radiographic abnormality.    Electronically signed by: Sanjay Brunner  Date:    07/19/2024  Time:    11:55        ASSESSMENT & PLAN:   Assessment:  CKD stage 5 needing initiation of dialysis  Left lower lobe pulmonary nodule, small  Leukocytosis ? Gastroenteritis   Macrocytic anemia, stable   Elevated lipase  History of leukemia    Plan:  - Nephrology consulted and recommended general surgery consult for temporary dialysis catheter placement for dialysis tonight  - Patient will need outpatient CT in 6-12 months for evaluation of pulmonary nodule  - Blood cultures and UA ordered for leukocytosis, chest x-ray negative  - Resume appropriate home medications when deemed necessary   - Labs in AM      VTE Prophylaxis: will be placed on SCD for DVT prophylaxis and will be advised to be as mobile as possible and sit in a chair as tolerated      __________________________________________________________________________  INPATIENT LIST OF MEDICATIONS     Current Facility-Administered Medications:     acetaminophen tablet 650 mg, 650 mg, Oral, Q8H PRN, Nataly Dempsey, PA-C    acetaminophen tablet 650 mg, 650 mg, Oral, Q4H PRN, Nataly Dempsey, PA-C    dextrose 10% bolus 125 mL 125 mL, 12.5 g, Intravenous, PRN, Nataly Dempsey, PA-C    dextrose 10% bolus 250 mL 250 mL, 25 g, Intravenous, PRN, Nataly Dempsey., PA-C    glucagon (human recombinant) injection 1 mg, 1 mg, Intramuscular, PRN, Nataly Dempsey., PA-C    glucose chewable tablet 16 g, 16 g, Oral, PRN, Nataly Dempsey E., PA-C    glucose chewable tablet 24 g, 24 g, Oral, PRN, Nataly Dempsey., PA-C    lactated ringers bolus 1,000 mL, 1,000 mL, Intravenous, ED 1 Time, Elizabeth Mai MD, Last Rate: 999 mL/hr  recorded. Patient is postmenopausal.      Declines chaperone for physical exam.    PVR= 69 mL by U/S.    Well developed, well nourished, in no apparent distress.   Neurologic/Psychiatric:  Awake, Alert and Oriented times 3.  Affect normal. Normal cranial nerves  Pulm: breathing without effort  Sexual maturity: Sesar stage V  Abd exam: soft, non-tender      GENITAL/URINARY:       External Genitalia:  The patient has normal appearing external genitalia, normal skenes and bartholins glands, and a normal hair distribution.  Her vulva is without lesions, erythema or discharge.  It is non-tender with appropriate sensation.     Urethral Meatus:  Size normal, Location normal, Lesions absent, Prolapse absent,      Urethra:  Fullness absent, Masses absent,      Bladder:  Fullness absent, Masses absent, Tenderness absent,      Vagina:  General appearance normal, Estrogen effect normal, Discharge absent, Lesions absent     Cervix: Normal, no discharge.   Uterus:  normal size and mobile  Adnexa:   normal    Anus/Perineum:  Lesions absent and Masses absent normal sphincter tone, no lesions  No Hemorrhoids, Normal Perineum    POP-Q:  Position: standing    Aa:   +3     Ba:  C: +8   Gh:  Pb:  TVL: 10         Ap: +3 Bp:  D: +8             She does not have myofascial tenderness on exam.           Safia Thompson MD     at 07/19/24 1529, 1,000 mL at 07/19/24 1529    mupirocin 2 % ointment, , Nasal, BID, Jairo Waller MD    ondansetron injection 4 mg, 4 mg, Intravenous, Q4H PRN, Nataly Dempsey PA-C    promethazine injection 25 mg, 25 mg, Intramuscular, ED 1 Time, Elizabeth Mai MD    sodium chloride 0.9% bolus 250 mL 250 mL, 250 mL, Intravenous, PRN, Elizabeth Mai MD    sodium chloride 0.9% flush 10 mL, 10 mL, Intravenous, Q12H PRN, Nataly Dempsey PA-C  No current outpatient medications on file.      Scheduled Meds:   lactated ringers  1,000 mL Intravenous ED 1 Time    mupirocin   Nasal BID    promethazine  25 mg Intramuscular ED 1 Time     Continuous Infusions:  PRN Meds:.  Current Facility-Administered Medications:     acetaminophen, 650 mg, Oral, Q8H PRN    acetaminophen, 650 mg, Oral, Q4H PRN    dextrose 10%, 12.5 g, Intravenous, PRN    dextrose 10%, 25 g, Intravenous, PRN    glucagon (human recombinant), 1 mg, Intramuscular, PRN    glucose, 16 g, Oral, PRN    glucose, 24 g, Oral, PRN    ondansetron, 4 mg, Intravenous, Q4H PRN    sodium chloride 0.9%, 250 mL, Intravenous, PRN    sodium chloride 0.9%, 10 mL, Intravenous, Q12H PRN      Discharge Planning and Disposition: Anticipated discharge to be determined.    INataly PA, have reviewed and discussed the case with Dr. Jairo Waller MD    Please see the following addendum for further assessment and plan from there attending MD.      Portion of this note is dictated using EMR integrated voice recognition software and may be subjected to voice recognition errors not corrected with proofreading. Please  for clarification if needed.       Nataly Dempsey PA-C  07/19/2024    I Dr CYNTHIA Waller assumed care of this patient today    For this patient encounter I performed the substantive portion of the visit. I reviewed the NP/PA/Resident documentation, treatment plan and medical decision making; and I had face-face time with this  patient   A. History  Patient with h/o CKD and CLL, moved from [place] to [place] in April 2024  Has no PCP, Nephrologist or oncologist   Has not been urinating past 2-3 days. Has been having nausea and vomiting   Denies any fever, chills, abdominal pain, SOB or cough     B.Physical exam   Heart RRR  Lungs clear   Abdomen soft and non tender   Neuro: No FND      C. Medical decision making    Patients labs and vitals reviewed   Seen by renal team and advised to start HD asap  Continue IV flagyl  Check stool culture and GI panel       Home meds reviewed     Labs in am

## (undated) DEVICE — TRAY CENT PREM SUT REM PK SGL

## (undated) DEVICE — GUIDEWIRE STF .035X260CM ANG

## (undated) DEVICE — Device

## (undated) DEVICE — COVER PROBE US 5.5X58L NON LTX

## (undated) DEVICE — GUIDEWIRE STD .035X180CM ANG

## (undated) DEVICE — SUT MONOCRYL 3-0 PS-2 UND

## (undated) DEVICE — ADHESIVE DERMABOND ADVANCED

## (undated) DEVICE — DRAPE UTILITY W/ TAPE 20X30IN

## (undated) DEVICE — CANNULA ADULT NASAL 7FT

## (undated) DEVICE — KIT MINI STICK MAX 5F 21GX10CM

## (undated) DEVICE — KIT MINI STK MAX COAX 5FR 10CM

## (undated) DEVICE — SUT SILK 0 BLK BR CT-1 30IN

## (undated) DEVICE — DRESSING TEGADERM CHG 3.5X4.5